# Patient Record
Sex: FEMALE | Race: WHITE | NOT HISPANIC OR LATINO | Employment: FULL TIME | URBAN - METROPOLITAN AREA
[De-identification: names, ages, dates, MRNs, and addresses within clinical notes are randomized per-mention and may not be internally consistent; named-entity substitution may affect disease eponyms.]

---

## 2017-04-29 ENCOUNTER — HOSPITAL ENCOUNTER (EMERGENCY)
Facility: HOSPITAL | Age: 47
Discharge: HOME | End: 2017-04-29
Attending: EMERGENCY MEDICINE | Admitting: EMERGENCY MEDICINE

## 2017-04-29 ENCOUNTER — APPOINTMENT (OUTPATIENT)
Dept: CT IMAGING | Facility: HOSPITAL | Age: 47
End: 2017-04-29

## 2017-04-29 VITALS
RESPIRATION RATE: 18 BRPM | TEMPERATURE: 97.8 F | HEART RATE: 82 BPM | BODY MASS INDEX: 21.52 KG/M2 | WEIGHT: 114 LBS | OXYGEN SATURATION: 98 % | SYSTOLIC BLOOD PRESSURE: 127 MMHG | DIASTOLIC BLOOD PRESSURE: 70 MMHG | HEIGHT: 61 IN

## 2017-04-29 DIAGNOSIS — N83.202 CYST OF LEFT OVARY: ICD-10-CM

## 2017-04-29 DIAGNOSIS — R10.9 LEFT FLANK PAIN: Primary | ICD-10-CM

## 2017-04-29 LAB
ALBUMIN SERPL-MCNC: 3.9 G/DL (ref 3.2–4.8)
ALBUMIN/GLOB SERPL: 1.4 G/DL (ref 1.5–2.5)
ALP SERPL-CCNC: 58 U/L (ref 25–100)
ALT SERPL W P-5'-P-CCNC: 11 U/L (ref 7–40)
ANION GAP SERPL CALCULATED.3IONS-SCNC: 3 MMOL/L (ref 3–11)
AST SERPL-CCNC: 15 U/L (ref 0–33)
B-HCG UR QL: NEGATIVE
BACTERIA UR QL AUTO: NORMAL /HPF
BASOPHILS # BLD AUTO: 0.03 10*3/MM3 (ref 0–0.2)
BASOPHILS NFR BLD AUTO: 0.4 % (ref 0–1)
BILIRUB SERPL-MCNC: 0.5 MG/DL (ref 0.3–1.2)
BILIRUB UR QL STRIP: NEGATIVE
BUN BLD-MCNC: 10 MG/DL (ref 9–23)
BUN/CREAT SERPL: 12.5 (ref 7–25)
CALCIUM SPEC-SCNC: 9.6 MG/DL (ref 8.7–10.4)
CHLORIDE SERPL-SCNC: 107 MMOL/L (ref 99–109)
CLARITY UR: ABNORMAL
CO2 SERPL-SCNC: 31 MMOL/L (ref 20–31)
COLOR UR: YELLOW
CREAT BLD-MCNC: 0.8 MG/DL (ref 0.6–1.3)
D DIMER PPP FEU-MCNC: <0.19 MG/L (FEU) (ref 0–0.5)
DEPRECATED RDW RBC AUTO: 46 FL (ref 37–54)
EOSINOPHIL # BLD AUTO: 0.07 10*3/MM3 (ref 0.1–0.3)
EOSINOPHIL NFR BLD AUTO: 0.8 % (ref 0–3)
ERYTHROCYTE [DISTWIDTH] IN BLOOD BY AUTOMATED COUNT: 12.7 % (ref 11.3–14.5)
GFR SERPL CREATININE-BSD FRML MDRD: 77 ML/MIN/1.73
GLOBULIN UR ELPH-MCNC: 2.7 GM/DL
GLUCOSE BLD-MCNC: 99 MG/DL (ref 70–100)
GLUCOSE UR STRIP-MCNC: NEGATIVE MG/DL
HCT VFR BLD AUTO: 39.8 % (ref 34.5–44)
HGB BLD-MCNC: 13.1 G/DL (ref 11.5–15.5)
HGB UR QL STRIP.AUTO: NEGATIVE
HOLD SPECIMEN: NORMAL
HOLD SPECIMEN: NORMAL
HYALINE CASTS UR QL AUTO: NORMAL /LPF
IMM GRANULOCYTES # BLD: 0.01 10*3/MM3 (ref 0–0.03)
IMM GRANULOCYTES NFR BLD: 0.1 % (ref 0–0.6)
INTERNAL NEGATIVE CONTROL: NEGATIVE
INTERNAL POSITIVE CONTROL: POSITIVE
KETONES UR QL STRIP: NEGATIVE
LEUKOCYTE ESTERASE UR QL STRIP.AUTO: NEGATIVE
LIPASE SERPL-CCNC: 27 U/L (ref 6–51)
LYMPHOCYTES # BLD AUTO: 1.62 10*3/MM3 (ref 0.6–4.8)
LYMPHOCYTES NFR BLD AUTO: 19.3 % (ref 24–44)
Lab: NORMAL
MCH RBC QN AUTO: 32.7 PG (ref 27–31)
MCHC RBC AUTO-ENTMCNC: 32.9 G/DL (ref 32–36)
MCV RBC AUTO: 99.3 FL (ref 80–99)
MONOCYTES # BLD AUTO: 0.48 10*3/MM3 (ref 0–1)
MONOCYTES NFR BLD AUTO: 5.7 % (ref 0–12)
NEUTROPHILS # BLD AUTO: 6.2 10*3/MM3 (ref 1.5–8.3)
NEUTROPHILS NFR BLD AUTO: 73.7 % (ref 41–71)
NITRITE UR QL STRIP: NEGATIVE
PH UR STRIP.AUTO: 7 [PH] (ref 5–8)
PLATELET # BLD AUTO: 281 10*3/MM3 (ref 150–450)
PMV BLD AUTO: 9.7 FL (ref 6–12)
POTASSIUM BLD-SCNC: 4.2 MMOL/L (ref 3.5–5.5)
PROT SERPL-MCNC: 6.6 G/DL (ref 5.7–8.2)
PROT UR QL STRIP: NEGATIVE
RBC # BLD AUTO: 4.01 10*6/MM3 (ref 3.89–5.14)
RBC # UR: NORMAL /HPF
REF LAB TEST METHOD: NORMAL
SODIUM BLD-SCNC: 141 MMOL/L (ref 132–146)
SP GR UR STRIP: 1.02 (ref 1–1.03)
SQUAMOUS #/AREA URNS HPF: NORMAL /HPF
UROBILINOGEN UR QL STRIP: ABNORMAL
WBC NRBC COR # BLD: 8.41 10*3/MM3 (ref 3.5–10.8)
WBC UR QL AUTO: NORMAL /HPF
WHOLE BLOOD HOLD SPECIMEN: NORMAL
WHOLE BLOOD HOLD SPECIMEN: NORMAL

## 2017-04-29 PROCEDURE — 0 IOPAMIDOL 61 % SOLUTION: Performed by: EMERGENCY MEDICINE

## 2017-04-29 PROCEDURE — 85379 FIBRIN DEGRADATION QUANT: CPT | Performed by: EMERGENCY MEDICINE

## 2017-04-29 PROCEDURE — 81001 URINALYSIS AUTO W/SCOPE: CPT | Performed by: EMERGENCY MEDICINE

## 2017-04-29 PROCEDURE — 74177 CT ABD & PELVIS W/CONTRAST: CPT

## 2017-04-29 PROCEDURE — 99283 EMERGENCY DEPT VISIT LOW MDM: CPT

## 2017-04-29 PROCEDURE — 83690 ASSAY OF LIPASE: CPT | Performed by: EMERGENCY MEDICINE

## 2017-04-29 PROCEDURE — 85025 COMPLETE CBC W/AUTO DIFF WBC: CPT | Performed by: EMERGENCY MEDICINE

## 2017-04-29 PROCEDURE — 80053 COMPREHEN METABOLIC PANEL: CPT | Performed by: EMERGENCY MEDICINE

## 2017-04-29 RX ORDER — SODIUM CHLORIDE 0.9 % (FLUSH) 0.9 %
10 SYRINGE (ML) INJECTION AS NEEDED
Status: DISCONTINUED | OUTPATIENT
Start: 2017-04-29 | End: 2017-04-29 | Stop reason: HOSPADM

## 2017-04-29 RX ORDER — LEVOTHYROXINE SODIUM 0.05 MG/1
50 TABLET ORAL DAILY
COMMUNITY
End: 2023-02-16 | Stop reason: SDUPTHER

## 2017-04-29 RX ORDER — CHLORAL HYDRATE 500 MG
CAPSULE ORAL
COMMUNITY

## 2017-04-29 RX ADMIN — IOPAMIDOL 100 ML: 612 INJECTION, SOLUTION INTRAVENOUS at 17:22

## 2017-07-25 ENCOUNTER — TRANSCRIBE ORDERS (OUTPATIENT)
Dept: ADMINISTRATIVE | Facility: HOSPITAL | Age: 47
End: 2017-07-25

## 2017-07-25 DIAGNOSIS — Z12.31 VISIT FOR SCREENING MAMMOGRAM: Primary | ICD-10-CM

## 2017-08-23 ENCOUNTER — HOSPITAL ENCOUNTER (OUTPATIENT)
Dept: MAMMOGRAPHY | Facility: HOSPITAL | Age: 47
Discharge: HOME | End: 2017-08-23
Attending: OBSTETRICS & GYNECOLOGY | Admitting: OBSTETRICS & GYNECOLOGY

## 2017-08-23 DIAGNOSIS — Z12.31 VISIT FOR SCREENING MAMMOGRAM: ICD-10-CM

## 2017-08-23 PROCEDURE — G0202 SCR MAMMO BI INCL CAD: HCPCS

## 2017-08-23 PROCEDURE — 77063 BREAST TOMOSYNTHESIS BI: CPT | Performed by: RADIOLOGY

## 2017-08-23 PROCEDURE — 77063 BREAST TOMOSYNTHESIS BI: CPT

## 2017-08-23 PROCEDURE — 77067 SCR MAMMO BI INCL CAD: CPT | Performed by: RADIOLOGY

## 2018-05-29 ENCOUNTER — TRANSCRIBE ORDERS (OUTPATIENT)
Dept: ADMINISTRATIVE | Facility: HOSPITAL | Age: 48
End: 2018-05-29

## 2018-05-29 DIAGNOSIS — Z12.31 VISIT FOR SCREENING MAMMOGRAM: Primary | ICD-10-CM

## 2018-08-29 ENCOUNTER — HOSPITAL ENCOUNTER (OUTPATIENT)
Dept: MAMMOGRAPHY | Facility: HOSPITAL | Age: 48
Discharge: HOME | End: 2018-08-29
Attending: OBSTETRICS & GYNECOLOGY | Admitting: OBSTETRICS & GYNECOLOGY

## 2018-08-29 DIAGNOSIS — Z12.31 VISIT FOR SCREENING MAMMOGRAM: ICD-10-CM

## 2018-08-29 PROCEDURE — 77063 BREAST TOMOSYNTHESIS BI: CPT | Performed by: RADIOLOGY

## 2018-08-29 PROCEDURE — 77067 SCR MAMMO BI INCL CAD: CPT | Performed by: RADIOLOGY

## 2018-08-29 PROCEDURE — 77067 SCR MAMMO BI INCL CAD: CPT

## 2018-08-29 PROCEDURE — 77063 BREAST TOMOSYNTHESIS BI: CPT

## 2019-07-23 ENCOUNTER — TRANSCRIBE ORDERS (OUTPATIENT)
Dept: ADMINISTRATIVE | Facility: HOSPITAL | Age: 49
End: 2019-07-23

## 2019-07-23 DIAGNOSIS — Z12.31 VISIT FOR SCREENING MAMMOGRAM: Primary | ICD-10-CM

## 2019-09-11 ENCOUNTER — APPOINTMENT (OUTPATIENT)
Dept: MAMMOGRAPHY | Facility: HOSPITAL | Age: 49
End: 2019-09-11

## 2019-10-22 ENCOUNTER — TRANSCRIBE ORDERS (OUTPATIENT)
Dept: ADMINISTRATIVE | Facility: HOSPITAL | Age: 49
End: 2019-10-22

## 2019-10-22 DIAGNOSIS — Z12.31 VISIT FOR SCREENING MAMMOGRAM: Primary | ICD-10-CM

## 2019-12-18 ENCOUNTER — HOSPITAL ENCOUNTER (OUTPATIENT)
Dept: MAMMOGRAPHY | Facility: HOSPITAL | Age: 49
Discharge: HOME | End: 2019-12-18
Admitting: OBSTETRICS & GYNECOLOGY

## 2019-12-18 DIAGNOSIS — Z12.31 VISIT FOR SCREENING MAMMOGRAM: ICD-10-CM

## 2019-12-18 PROCEDURE — 77063 BREAST TOMOSYNTHESIS BI: CPT

## 2019-12-18 PROCEDURE — 77067 SCR MAMMO BI INCL CAD: CPT

## 2019-12-18 PROCEDURE — 77063 BREAST TOMOSYNTHESIS BI: CPT | Performed by: RADIOLOGY

## 2019-12-18 PROCEDURE — 77067 SCR MAMMO BI INCL CAD: CPT | Performed by: RADIOLOGY

## 2020-11-09 ENCOUNTER — TRANSCRIBE ORDERS (OUTPATIENT)
Dept: OBSTETRICS AND GYNECOLOGY | Facility: CLINIC | Age: 50
End: 2020-11-09

## 2020-11-09 DIAGNOSIS — Z12.31 VISIT FOR SCREENING MAMMOGRAM: Primary | ICD-10-CM

## 2021-01-08 RX ORDER — FLUCONAZOLE 150 MG/1
TABLET ORAL
Qty: 4 TABLET | Refills: 11 | OUTPATIENT
Start: 2021-01-08

## 2021-01-08 RX ORDER — FLUCONAZOLE 150 MG/1
TABLET ORAL
Qty: 4 TABLET | Refills: 4 | OUTPATIENT
Start: 2021-01-08

## 2021-01-14 RX ORDER — FLUCONAZOLE 150 MG/1
150 TABLET ORAL DAILY
Qty: 4 TABLET | Refills: 1 | Status: SHIPPED | OUTPATIENT
Start: 2021-01-14 | End: 2021-02-10 | Stop reason: SDUPTHER

## 2021-02-10 ENCOUNTER — HOSPITAL ENCOUNTER (OUTPATIENT)
Dept: MAMMOGRAPHY | Facility: HOSPITAL | Age: 51
Discharge: HOME | End: 2021-02-10
Admitting: OBSTETRICS & GYNECOLOGY

## 2021-02-10 ENCOUNTER — LAB (OUTPATIENT)
Dept: LAB | Facility: HOSPITAL | Age: 51
End: 2021-02-10

## 2021-02-10 ENCOUNTER — OFFICE VISIT (OUTPATIENT)
Dept: OBSTETRICS AND GYNECOLOGY | Facility: CLINIC | Age: 51
End: 2021-02-10

## 2021-02-10 VITALS
SYSTOLIC BLOOD PRESSURE: 110 MMHG | DIASTOLIC BLOOD PRESSURE: 68 MMHG | BODY MASS INDEX: 22.66 KG/M2 | HEIGHT: 61 IN | WEIGHT: 120 LBS

## 2021-02-10 DIAGNOSIS — Z30.431 IUD CHECK UP: ICD-10-CM

## 2021-02-10 DIAGNOSIS — Z01.419 WELL WOMAN EXAM WITH ROUTINE GYNECOLOGICAL EXAM: Primary | ICD-10-CM

## 2021-02-10 DIAGNOSIS — R63.5 WEIGHT GAIN: ICD-10-CM

## 2021-02-10 DIAGNOSIS — Z12.31 VISIT FOR SCREENING MAMMOGRAM: ICD-10-CM

## 2021-02-10 DIAGNOSIS — N76.0 RECURRENT VAGINITIS: ICD-10-CM

## 2021-02-10 LAB
25(OH)D3 SERPL-MCNC: 50.1 NG/ML (ref 30–100)
BASOPHILS # BLD AUTO: 0.05 10*3/MM3 (ref 0–0.2)
BASOPHILS NFR BLD AUTO: 1 % (ref 0–1.5)
DEPRECATED RDW RBC AUTO: 41.5 FL (ref 37–54)
EOSINOPHIL # BLD AUTO: 0.06 10*3/MM3 (ref 0–0.4)
EOSINOPHIL NFR BLD AUTO: 1.2 % (ref 0.3–6.2)
ERYTHROCYTE [DISTWIDTH] IN BLOOD BY AUTOMATED COUNT: 11.8 % (ref 12.3–15.4)
HBA1C MFR BLD: 4.95 % (ref 4.8–5.6)
HCT VFR BLD AUTO: 39.7 % (ref 34–46.6)
HGB BLD-MCNC: 13.4 G/DL (ref 12–15.9)
IMM GRANULOCYTES # BLD AUTO: 0.01 10*3/MM3 (ref 0–0.05)
IMM GRANULOCYTES NFR BLD AUTO: 0.2 % (ref 0–0.5)
LYMPHOCYTES # BLD AUTO: 1.61 10*3/MM3 (ref 0.7–3.1)
LYMPHOCYTES NFR BLD AUTO: 32.9 % (ref 19.6–45.3)
MCH RBC QN AUTO: 32.3 PG (ref 26.6–33)
MCHC RBC AUTO-ENTMCNC: 33.8 G/DL (ref 31.5–35.7)
MCV RBC AUTO: 95.7 FL (ref 79–97)
MONOCYTES # BLD AUTO: 0.38 10*3/MM3 (ref 0.1–0.9)
MONOCYTES NFR BLD AUTO: 7.8 % (ref 5–12)
NEUTROPHILS NFR BLD AUTO: 2.78 10*3/MM3 (ref 1.7–7)
NEUTROPHILS NFR BLD AUTO: 56.9 % (ref 42.7–76)
NRBC BLD AUTO-RTO: 0 /100 WBC (ref 0–0.2)
PLATELET # BLD AUTO: 303 10*3/MM3 (ref 140–450)
PMV BLD AUTO: 10.4 FL (ref 6–12)
RBC # BLD AUTO: 4.15 10*6/MM3 (ref 3.77–5.28)
WBC # BLD AUTO: 4.89 10*3/MM3 (ref 3.4–10.8)

## 2021-02-10 PROCEDURE — 77063 BREAST TOMOSYNTHESIS BI: CPT

## 2021-02-10 PROCEDURE — 77063 BREAST TOMOSYNTHESIS BI: CPT | Performed by: RADIOLOGY

## 2021-02-10 PROCEDURE — 77067 SCR MAMMO BI INCL CAD: CPT

## 2021-02-10 PROCEDURE — 99214 OFFICE O/P EST MOD 30 MIN: CPT | Performed by: OBSTETRICS & GYNECOLOGY

## 2021-02-10 PROCEDURE — 85025 COMPLETE CBC W/AUTO DIFF WBC: CPT | Performed by: OBSTETRICS & GYNECOLOGY

## 2021-02-10 PROCEDURE — 36415 COLL VENOUS BLD VENIPUNCTURE: CPT | Performed by: OBSTETRICS & GYNECOLOGY

## 2021-02-10 PROCEDURE — 83036 HEMOGLOBIN GLYCOSYLATED A1C: CPT | Performed by: OBSTETRICS & GYNECOLOGY

## 2021-02-10 PROCEDURE — 80053 COMPREHEN METABOLIC PANEL: CPT | Performed by: OBSTETRICS & GYNECOLOGY

## 2021-02-10 PROCEDURE — 83001 ASSAY OF GONADOTROPIN (FSH): CPT | Performed by: OBSTETRICS & GYNECOLOGY

## 2021-02-10 PROCEDURE — 77067 SCR MAMMO BI INCL CAD: CPT | Performed by: RADIOLOGY

## 2021-02-10 PROCEDURE — 99396 PREV VISIT EST AGE 40-64: CPT | Performed by: OBSTETRICS & GYNECOLOGY

## 2021-02-10 PROCEDURE — 84443 ASSAY THYROID STIM HORMONE: CPT | Performed by: OBSTETRICS & GYNECOLOGY

## 2021-02-10 PROCEDURE — 80061 LIPID PANEL: CPT | Performed by: OBSTETRICS & GYNECOLOGY

## 2021-02-10 PROCEDURE — 82306 VITAMIN D 25 HYDROXY: CPT | Performed by: OBSTETRICS & GYNECOLOGY

## 2021-02-10 RX ORDER — FLUCONAZOLE 150 MG/1
150 TABLET ORAL DAILY
Qty: 4 TABLET | Refills: 11 | Status: SHIPPED | OUTPATIENT
Start: 2021-02-10 | End: 2021-08-04

## 2021-02-10 RX ORDER — LORAZEPAM 1 MG/1
TABLET ORAL
COMMUNITY
Start: 2021-01-22 | End: 2023-02-22

## 2021-02-10 NOTE — PROGRESS NOTES
Annual GYN < 27 years of age        Patient Name: Joycelyn Quintero  : 1970   MRN: [id number]     Chief Complaint:    Chief Complaint   Patient presents with   • Gynecologic Exam       History of Present Illness: Joycelyn Quintero is a 50 y.o. female,   who presents today for her annual well woman exam. C/o weight gain, bloating, cramping.     Menstrual History:   Last LMP: No LMP recorded. Patient has had an implant. She has a Mirena IUD that was placed in .    Last Pap : ; neg.   Last Completed Pap Smear       Status Date      PAP SMEAR No completions recorded        Periods/Cycles are: absent  Sexual History:   Sexually active: yes  Partner Status: Marital Status: single.    Contraception: contraceptive methods: IUD.  Insertion date:   STD testing: declines     She needs a refill of her diflucan.     Subjective      Review of Systems:   Review of Systems   Constitutional: Positive for unexpected weight gain.   HENT: Negative.    Eyes: Negative.    Respiratory: Negative.    Cardiovascular: Negative.    Gastrointestinal: Negative.    Endocrine: Negative.    Genitourinary: Positive for menstrual problem.   Musculoskeletal: Negative.    Skin: Negative.    Allergic/Immunologic: Negative.    Neurological: Negative.    Hematological: Negative.    Psychiatric/Behavioral: Negative.        The additional following portions of the patient's history were reviewed and updated as appropriate: past medical history, past surgical history, past family history, past social history.     I have reviewed and agree with the HPI, ROS and historical information as entered above. Karen Phillips MD    OB History:   OB History        2    Para   2    Term   2            AB        Living           SAB        TAB        Ectopic        Molar        Multiple        Live Births                   Tobacco Usage: never  Social History     Tobacco Use   Smoking Status Never Smoker   Smokeless Tobacco  "Never Used      Alcohol/Drug Use: socially  Social History     Substance and Sexual Activity   Alcohol Use No        Medications:     Current Outpatient Medications:   •  fluconazole (Diflucan) 150 MG tablet, Take 1 tablet by mouth Daily. take one tablet weekly, Disp: 4 tablet, Rfl: 11  •  levothyroxine (SYNTHROID, LEVOTHROID) 50 MCG tablet, Take 50 mcg by mouth Daily., Disp: , Rfl:   •  LORazepam (ATIVAN) 1 MG tablet, TAKE 1/2 1 TABLET BY MOUTH AT NIGHT FOR MUSCLE SPASMS, Disp: , Rfl:   •  Multiple Vitamins-Minerals (MULTIVITAMIN & MINERAL PO), Take  by mouth., Disp: , Rfl:   •  Omega-3 Fatty Acids (FISH OIL) 1000 MG capsule capsule, Take  by mouth Daily With Breakfast., Disp: , Rfl:     Allergies:   Allergies   Allergen Reactions   • Biaxin [Clarithromycin]        Health History:   Exercises Regularly : yes  Monthly Self-Breast Exam: yes      Immunizations/Vaccinations:   Flu (Yearly): no  Gardasil: no         Objective     Physical Exam:  Vital Signs: /68   Ht 154.9 cm (61\")   Wt 54.4 kg (120 lb)   Breastfeeding No Comment: IUD  BMI 22.67 kg/m²    Body mass index is 22.67 kg/m².     Physical Exam  Vitals signs and nursing note reviewed. Exam conducted with a chaperone present.   Constitutional:       Appearance: She is well-developed.   HENT:      Head: Normocephalic and atraumatic.   Eyes:      Pupils: Pupils are equal, round, and reactive to light.   Neck:      Musculoskeletal: Normal range of motion. No muscular tenderness.      Thyroid: No thyroid mass or thyromegaly.   Pulmonary:      Effort: Pulmonary effort is normal. No retractions.   Chest:      Chest wall: No mass.      Breasts:         Right: Normal. No mass, nipple discharge, skin change or tenderness.         Left: Normal. No mass, nipple discharge, skin change or tenderness.   Abdominal:      General: Bowel sounds are normal.      Palpations: Abdomen is soft. Abdomen is not rigid. There is no mass.      Tenderness: There is no abdominal " tenderness. There is no guarding.      Hernia: No hernia is present. There is no hernia in the left inguinal area or right inguinal area.   Genitourinary:     Exam position: Lithotomy position.      Pubic Area: No rash.       Labia:         Right: No rash, tenderness or lesion.         Left: No rash, tenderness or lesion.       Urethra: No urethral pain or urethral swelling.      Vagina: Normal. No vaginal discharge or lesions.      Cervix: No cervical motion tenderness, discharge, lesion or cervical bleeding.      Uterus: Normal. Not enlarged, not fixed and not tender.       Adnexa:         Right: No mass, tenderness or fullness.          Left: No mass, tenderness or fullness.        Rectum: No external hemorrhoid.   Musculoskeletal:      Right lower leg: No edema.      Left lower leg: No edema.   Skin:     General: Skin is warm and dry.   Neurological:      Mental Status: She is alert and oriented to person, place, and time.      Motor: Motor function is intact.   Psychiatric:         Mood and Affect: Mood and affect normal.         Behavior: Behavior normal.     strings seen    Assessment / Plan      Assessment/Plan:   Diagnoses and all orders for this visit:    1. Well woman exam with routine gynecological exam (Primary)  -     Pap IG, HPV-hr; Future    2. IUD check up    3. Weight gain  -     CBC & Differential  -     Comprehensive Metabolic Panel  -     Hemoglobin A1c  -     Vitamin D 25 Hydroxy  -     TSH  -     Lipid Panel  -     Follicle Stimulating Hormone    4. Recurrent vaginitis    Other orders  -     fluconazole (Diflucan) 150 MG tablet; Take 1 tablet by mouth Daily. take one tablet weekly  Dispense: 4 tablet; Refill: 11          1. IUD check  2. Self Breast Exams demonstrated and encouraged.   3. Discussed STD, use of condoms, use of contraception.   4. Diet and Exercise discussed and encouraged.     Follow Up: Return in about 1 year (around 2/10/2022) for Annual physical.        Karen Phillips,  MD  02/10/2021

## 2021-02-11 LAB
ALBUMIN SERPL-MCNC: 4.3 G/DL (ref 3.5–5.2)
ALBUMIN/GLOB SERPL: 1.8 G/DL
ALP SERPL-CCNC: 87 U/L (ref 39–117)
ALT SERPL W P-5'-P-CCNC: 12 U/L (ref 1–33)
ANION GAP SERPL CALCULATED.3IONS-SCNC: 8.9 MMOL/L (ref 5–15)
AST SERPL-CCNC: 14 U/L (ref 1–32)
BILIRUB SERPL-MCNC: 0.4 MG/DL (ref 0–1.2)
BUN SERPL-MCNC: 14 MG/DL (ref 6–20)
BUN/CREAT SERPL: 19.4 (ref 7–25)
CALCIUM SPEC-SCNC: 9.4 MG/DL (ref 8.6–10.5)
CHLORIDE SERPL-SCNC: 107 MMOL/L (ref 98–107)
CHOLEST SERPL-MCNC: 139 MG/DL (ref 0–200)
CO2 SERPL-SCNC: 26.1 MMOL/L (ref 22–29)
CREAT SERPL-MCNC: 0.72 MG/DL (ref 0.57–1)
FSH SERPL-ACNC: 56.7 MIU/ML
GFR SERPL CREATININE-BSD FRML MDRD: 86 ML/MIN/1.73
GLOBULIN UR ELPH-MCNC: 2.4 GM/DL
GLUCOSE SERPL-MCNC: 83 MG/DL (ref 65–99)
HDLC SERPL-MCNC: 66 MG/DL (ref 40–60)
LDLC SERPL CALC-MCNC: 66 MG/DL (ref 0–100)
LDLC/HDLC SERPL: 1.03 {RATIO}
POTASSIUM SERPL-SCNC: 4.3 MMOL/L (ref 3.5–5.2)
PROT SERPL-MCNC: 6.7 G/DL (ref 6–8.5)
SODIUM SERPL-SCNC: 142 MMOL/L (ref 136–145)
TRIGL SERPL-MCNC: 24 MG/DL (ref 0–150)
TSH SERPL DL<=0.05 MIU/L-ACNC: 2.29 UIU/ML (ref 0.27–4.2)
VLDLC SERPL-MCNC: 7 MG/DL (ref 5–40)

## 2021-02-17 DIAGNOSIS — Z01.419 WELL WOMAN EXAM WITH ROUTINE GYNECOLOGICAL EXAM: ICD-10-CM

## 2021-03-04 ENCOUNTER — HOSPITAL ENCOUNTER (OUTPATIENT)
Dept: MAMMOGRAPHY | Facility: HOSPITAL | Age: 51
Discharge: HOME | End: 2021-03-04
Admitting: RADIOLOGY

## 2021-03-04 DIAGNOSIS — R92.8 ABNORMAL MAMMOGRAM: ICD-10-CM

## 2021-03-04 PROCEDURE — 77061 BREAST TOMOSYNTHESIS UNI: CPT | Performed by: RADIOLOGY

## 2021-03-04 PROCEDURE — 77065 DX MAMMO INCL CAD UNI: CPT | Performed by: RADIOLOGY

## 2021-03-04 PROCEDURE — G0279 TOMOSYNTHESIS, MAMMO: HCPCS

## 2021-03-04 PROCEDURE — 77065 DX MAMMO INCL CAD UNI: CPT

## 2021-08-04 RX ORDER — FLUCONAZOLE 150 MG/1
TABLET ORAL
Qty: 4 TABLET | Refills: 5 | Status: SHIPPED | OUTPATIENT
Start: 2021-08-04 | End: 2022-02-24

## 2022-01-10 ENCOUNTER — TRANSCRIBE ORDERS (OUTPATIENT)
Dept: ADMINISTRATIVE | Facility: HOSPITAL | Age: 52
End: 2022-01-10

## 2022-01-10 DIAGNOSIS — Z12.31 VISIT FOR SCREENING MAMMOGRAM: Primary | ICD-10-CM

## 2022-02-24 DIAGNOSIS — N76.0 RECURRENT VAGINITIS: Primary | ICD-10-CM

## 2022-02-24 RX ORDER — FLUCONAZOLE 150 MG/1
TABLET ORAL
Qty: 4 TABLET | Refills: 5 | Status: SHIPPED | OUTPATIENT
Start: 2022-02-24 | End: 2022-08-15

## 2022-02-24 NOTE — TELEPHONE ENCOUNTER
Patient requesting refill for Diflucan. Patient states she is completely out. Verified patients pharmacy as [facility] in [place]. Advised patient that I would send to [facility] and have it called it. Annual appt scheduled 03/09/2022. Patient V/U.

## 2022-03-09 ENCOUNTER — APPOINTMENT (OUTPATIENT)
Dept: MAMMOGRAPHY | Facility: HOSPITAL | Age: 52
End: 2022-03-09

## 2022-04-20 ENCOUNTER — HOSPITAL ENCOUNTER (OUTPATIENT)
Dept: MAMMOGRAPHY | Facility: HOSPITAL | Age: 52
Discharge: HOME | End: 2022-04-20
Admitting: OBSTETRICS & GYNECOLOGY

## 2022-04-20 ENCOUNTER — OFFICE VISIT (OUTPATIENT)
Dept: OBSTETRICS AND GYNECOLOGY | Facility: CLINIC | Age: 52
End: 2022-04-20

## 2022-04-20 VITALS
WEIGHT: 124 LBS | SYSTOLIC BLOOD PRESSURE: 124 MMHG | HEIGHT: 61 IN | DIASTOLIC BLOOD PRESSURE: 78 MMHG | BODY MASS INDEX: 23.41 KG/M2

## 2022-04-20 DIAGNOSIS — Z12.31 VISIT FOR SCREENING MAMMOGRAM: ICD-10-CM

## 2022-04-20 DIAGNOSIS — Z30.431 IUD CHECK UP: ICD-10-CM

## 2022-04-20 DIAGNOSIS — Z01.419 WOMEN'S ANNUAL ROUTINE GYNECOLOGICAL EXAMINATION: Primary | ICD-10-CM

## 2022-04-20 PROCEDURE — 99396 PREV VISIT EST AGE 40-64: CPT | Performed by: OBSTETRICS & GYNECOLOGY

## 2022-04-20 PROCEDURE — 77063 BREAST TOMOSYNTHESIS BI: CPT

## 2022-04-20 PROCEDURE — 77067 SCR MAMMO BI INCL CAD: CPT

## 2022-04-20 PROCEDURE — 77067 SCR MAMMO BI INCL CAD: CPT | Performed by: RADIOLOGY

## 2022-04-20 PROCEDURE — 77063 BREAST TOMOSYNTHESIS BI: CPT | Performed by: RADIOLOGY

## 2022-04-20 NOTE — PROGRESS NOTES
GYN Annual Exam     CC - Here for annual exam.        HPI  Joycelyn Quintero is a 51 y.o. female, , who presents for annual well woman exam.  She is perimenopausal.  Periods are absent secondary to Mirena. Dysmenorrhea:none. Patient reports problems with: hot flashes, night sweats, weight gain, and trouble sleeping. Patient reports these symptoms have been going on for the last year. There were no changes to her medical or surgical history since her last visit.. Partner Status: Marital Status: single.  New Partners since last visit: no.  Patient declines the need for STD testing today.     Patient requested repeat pap smear this year.     Additional OB/GYN History   Current contraception: contraceptive methods: IUD.  Insertion date:  - Mirena   Desires to: continue contraception  On HRT? No  Last Pap : 02/10/2021. Results: Negative HPV Negative   Last Completed Pap Smear          Ordered - PAP SMEAR (Every 3 Years) Ordered on 2021  Pap IG, HPV-hr              History of abnormal Pap smear: no  Family history of uterine, colon, breast, or ovarian cancer: no  Performs monthly Self-Breast Exam: no  Last mammogram: 2022 Done at [facility].    Last Completed Mammogram          Scheduled - MAMMOGRAM (Every 2 Years) Scheduled for 2021  Mammo Diagnostic Digital Tomosynthesis Left With CAD    02/10/2021  Mammo Screening Digital Tomosynthesis Bilateral With CAD    2019  Mammo Screening Digital Tomosynthesis Bilateral With CAD    2018  Mammo Screening Digital Tomosynthesis Bilateral With CAD    2017  Mammo Screening Digital Tomosynthesis Bilateral With CAD    Only the first 5 history entries have been loaded, but more history exists.              Last colonoscopy: Years ago per pt - [facility] [place] - Dr. Kendall Thompson - Normal per pt.   Last Completed Colonoscopy     This patient has no relevant Health Maintenance data.         Last DEXA: None  Exercises Regularly: no  Feelings of Anxiety or Depression: no      Tobacco Usage?: No   OB History        2    Para   2    Term   2            AB        Living   2       SAB        IAB        Ectopic        Molar        Multiple        Live Births   2                Health Maintenance   Topic Date Due   • COLORECTAL CANCER SCREENING  Never done   • ANNUAL PHYSICAL  Never done   • COVID-19 Vaccine (1) Never done   • TDAP/TD VACCINES (1 - Tdap) Never done   • HEPATITIS C SCREENING  Never done   • ZOSTER VACCINE (1 of 2) Never done   • Annual Gynecologic Pelvic and Breast Exam  2022   • INFLUENZA VACCINE  2022   • MAMMOGRAM  2023   • PAP SMEAR  2024   • Pneumococcal Vaccine 0-64  Aged Out       The additional following portions of the patient's history were reviewed and updated as appropriate: allergies, current medications, past family history, past medical history, past social history and past surgical history.    Review of Systems   Constitutional: Negative for chills, fatigue, unexpected weight gain and unexpected weight loss.   HENT: Negative for sore throat and swollen glands.    Respiratory: Negative for cough and shortness of breath.    Cardiovascular: Negative for chest pain, palpitations and leg swelling.   Gastrointestinal: Negative for abdominal distention, abdominal pain, blood in stool, constipation, diarrhea and nausea.   Endocrine: Negative for cold intolerance and heat intolerance.   Genitourinary: Negative for breast discharge, breast lump, frequency, hematuria, pelvic pain, vaginal bleeding, vaginal discharge and vaginal pain.   Musculoskeletal: Negative for gait problem and myalgias.   Allergic/Immunologic: Negative for immunocompromised state.   Neurological: Negative for dizziness, headache and confusion.   Psychiatric/Behavioral: Negative for suicidal ideas and depressed mood.       I have reviewed and agree with the HPI, ROS, and  "historical information as entered above. Karen Phillips MD    Objective   /78 (BP Location: Left arm, Patient Position: Sitting, Cuff Size: Adult)   Ht 154.9 cm (61\")   Wt 56.2 kg (124 lb)   LMP  (LMP Unknown)   BMI 23.43 kg/m²     Physical Exam  Vitals and nursing note reviewed. Exam conducted with a chaperone present.   Constitutional:       Appearance: She is well-developed.   HENT:      Head: Normocephalic and atraumatic.   Eyes:      Pupils: Pupils are equal, round, and reactive to light.   Neck:      Thyroid: No thyroid mass or thyromegaly.   Pulmonary:      Effort: Pulmonary effort is normal. No retractions.   Chest:      Chest wall: No mass.   Breasts:      Right: Normal. No mass, nipple discharge, skin change or tenderness.      Left: Normal. No mass, nipple discharge, skin change or tenderness.       Abdominal:      General: Bowel sounds are normal.      Palpations: Abdomen is soft. Abdomen is not rigid. There is no mass.      Tenderness: There is no abdominal tenderness. There is no guarding.      Hernia: No hernia is present. There is no hernia in the left inguinal area or right inguinal area.   Genitourinary:     Exam position: Lithotomy position.      Pubic Area: No rash.       Labia:         Right: No rash, tenderness or lesion.         Left: No rash, tenderness or lesion.       Urethra: No urethral pain or urethral swelling.      Vagina: Normal. No vaginal discharge or lesions.      Cervix: No cervical motion tenderness, discharge, lesion or cervical bleeding.      Uterus: Normal. Not enlarged, not fixed and not tender.       Adnexa:         Right: No mass, tenderness or fullness.          Left: No mass, tenderness or fullness.        Rectum: No external hemorrhoid.   Musculoskeletal:      Cervical back: Normal range of motion. No muscular tenderness.      Right lower leg: No edema.      Left lower leg: No edema.   Skin:     General: Skin is warm and dry.   Neurological:      Mental Status: " She is alert and oriented to person, place, and time.      Motor: Motor function is intact.   Psychiatric:         Mood and Affect: Mood and affect normal.         Behavior: Behavior normal.     strings seen       Assessment and Plan    Problem List Items Addressed This Visit    None     Visit Diagnoses     Women's annual routine gynecological examination    -  Primary    Relevant Orders    Pap IG, Rfx HPV ASCU    IUD check up              1. GYN annual well woman exam.   2. Reviewed monthly self breast exams.  Instructed to call with lumps, pain, or breast discharge.  Yearly mammograms ordered.  3. Ordered mammogram today.  4. Reviewed exercise as a preventative health measures.   5. Symptoms of menopausal transition reviewed with patient.   6. Reccommended Flu Vaccine in Fall of each year.  7. RTC in 1 year or PRN with problems.  8. Return in about 1 year (around 4/20/2023) for Annual physical.     Karen Phillips MD  04/20/2022

## 2022-04-26 DIAGNOSIS — Z01.419 WOMEN'S ANNUAL ROUTINE GYNECOLOGICAL EXAMINATION: ICD-10-CM

## 2022-08-13 DIAGNOSIS — N76.0 RECURRENT VAGINITIS: ICD-10-CM

## 2022-08-15 RX ORDER — FLUCONAZOLE 150 MG/1
TABLET ORAL
Qty: 4 TABLET | Refills: 5 | Status: SHIPPED | OUTPATIENT
Start: 2022-08-15 | End: 2023-03-16 | Stop reason: SDUPTHER

## 2023-02-16 ENCOUNTER — TELEPHONE (OUTPATIENT)
Dept: FAMILY MEDICINE CLINIC | Facility: CLINIC | Age: 53
End: 2023-02-16

## 2023-02-16 DIAGNOSIS — E03.9 HYPOTHYROIDISM, UNSPECIFIED TYPE: Primary | ICD-10-CM

## 2023-02-16 RX ORDER — LEVOTHYROXINE SODIUM 0.05 MG/1
50 TABLET ORAL DAILY
Qty: 7 TABLET | Refills: 0 | Status: SHIPPED | OUTPATIENT
Start: 2023-02-16 | End: 2023-03-03

## 2023-02-16 RX ORDER — LEVOTHYROXINE SODIUM 0.05 MG/1
50 TABLET ORAL DAILY
Status: CANCELLED | OUTPATIENT
Start: 2023-02-16

## 2023-02-16 NOTE — TELEPHONE ENCOUNTER
Caller: Joycelyn Quintero    Relationship: Self    Best call back number: [phone]    Requested Prescriptions:   Requested Prescriptions     Pending Prescriptions Disp Refills   • levothyroxine (SYNTHROID, LEVOTHROID) 50 MCG tablet       Sig: Take 1 tablet by mouth Daily.        Pharmacy where request should be sent: [facility][id number] - [place], [place] - [facility] [address] [facility] TO [facility] - [phone]  - [phone] FX     Additional details provided by patient: PATIENT STATED SHE REQUESTED A REFILL SEVERAL DAYS AGO AND HAS BEEN OUT OF HER PRESCRIPTION FOR 4 DAYS.    Does the patient have less than a 3 day supply:  [x] Yes  [] No    Would you like a call back once the refill request has been completed: [x] Yes [] No    If the office needs to give you a call back, can they leave a voicemail: [x] Yes [] No    Gurwinder Estrada Rep   02/16/23 09:19 EST

## 2023-02-16 NOTE — TELEPHONE ENCOUNTER
Called pt to discuss that she has not been seen for an annual visit in 2 years and will need to be seen for further medication refills. She was not happy because she has had nothing but trouble trying to call out here and not getting through and she almost just choose to not come back. I offered her an appointment with Lauren next week and let her know we would send in a refill of her meds but she would need to keep the appointment. She did not want to see Lauren because she doesn't know her and only wanted to see Morris or Lorenzo. I scheduled her with Lorenzo and informed Julissa (practice manager) and she is going to call and discuss that Lorenzo and Morris are not taking on new adult patients at this time.

## 2023-02-21 PROBLEM — F41.9 ANXIETY DISORDER: Status: ACTIVE | Noted: 2023-02-21

## 2023-02-21 PROBLEM — F41.0 PANIC ATTACKS: Status: ACTIVE | Noted: 2023-02-21

## 2023-02-21 PROBLEM — I34.1 MITRAL VALVE PROLAPSE: Status: ACTIVE | Noted: 2023-02-21

## 2023-02-21 PROBLEM — M54.50 LUMBAGO: Status: ACTIVE | Noted: 2023-02-21

## 2023-02-21 PROBLEM — M25.50 JOINT PAIN: Status: ACTIVE | Noted: 2023-02-21

## 2023-02-21 PROBLEM — M79.10 MYALGIA: Status: ACTIVE | Noted: 2023-02-21

## 2023-02-21 PROBLEM — E03.9 HYPOTHYROIDISM: Status: ACTIVE | Noted: 2023-02-21

## 2023-02-22 ENCOUNTER — OFFICE VISIT (OUTPATIENT)
Dept: FAMILY MEDICINE CLINIC | Facility: CLINIC | Age: 53
End: 2023-02-22
Payer: COMMERCIAL

## 2023-02-22 VITALS
WEIGHT: 129 LBS | SYSTOLIC BLOOD PRESSURE: 120 MMHG | BODY MASS INDEX: 24.35 KG/M2 | HEART RATE: 77 BPM | HEIGHT: 61 IN | DIASTOLIC BLOOD PRESSURE: 76 MMHG | OXYGEN SATURATION: 97 % | TEMPERATURE: 97 F

## 2023-02-22 DIAGNOSIS — G47.00 INSOMNIA, UNSPECIFIED TYPE: ICD-10-CM

## 2023-02-22 DIAGNOSIS — Z12.11 SCREEN FOR COLON CANCER: ICD-10-CM

## 2023-02-22 DIAGNOSIS — Z11.4 SCREENING FOR HIV (HUMAN IMMUNODEFICIENCY VIRUS): ICD-10-CM

## 2023-02-22 DIAGNOSIS — Z01.818 PREOP EXAMINATION: ICD-10-CM

## 2023-02-22 DIAGNOSIS — Z11.59 NEED FOR HEPATITIS C SCREENING TEST: ICD-10-CM

## 2023-02-22 DIAGNOSIS — Z00.01 ENCOUNTER FOR GENERAL ADULT MEDICAL EXAMINATION WITH ABNORMAL FINDINGS: Primary | ICD-10-CM

## 2023-02-22 DIAGNOSIS — Z23 NEED FOR TDAP VACCINATION: ICD-10-CM

## 2023-02-22 DIAGNOSIS — E03.9 ACQUIRED HYPOTHYROIDISM: ICD-10-CM

## 2023-02-22 PROCEDURE — 90471 IMMUNIZATION ADMIN: CPT | Performed by: INTERNAL MEDICINE

## 2023-02-22 PROCEDURE — 99213 OFFICE O/P EST LOW 20 MIN: CPT | Performed by: INTERNAL MEDICINE

## 2023-02-22 PROCEDURE — 99396 PREV VISIT EST AGE 40-64: CPT | Performed by: INTERNAL MEDICINE

## 2023-02-22 PROCEDURE — 90715 TDAP VACCINE 7 YRS/> IM: CPT | Performed by: INTERNAL MEDICINE

## 2023-02-22 NOTE — PROGRESS NOTES
Female Physical Note      Date: 2023   Patient Name: Joycelyn Quintero  : 1970   MRN: [id number]     Chief Complaint:    Chief Complaint   Patient presents with   • Annual Exam       History of Present Illness: Joycelyn Quintero is a 52 y.o. female who is here today for their annual health maintenance and physical.  Patient has a history of hypothyroidism taking levothyroxine 50 mcg daily, due for related lab testing.  She denies any specific complaints at this time other than has had some trouble at times with some insomnia with her mind racing waking up.  She has taken some Unisom as well as melatonin over-the-counter with only variable results.  Denies currently being anxious or depressed though she has had problems in the past with some anxiety and panic attacks.  She also had problems in the not too distant past with some arthralgias and myalgias which had prompted a negative rheumatological work-up and ultimate referral to rheumatology with no autoimmune diagnosis made.  Good energy.  No other specific complaints.  She is current with her gynecological care with Dr. Phillips with having had a normal Pap smear in 2022 and normal mammogram in 2022.  She is due for a screening colonoscopy with Dr. Thompson, her last study that I can document from .  Not having any GI related problems.  No other specific concerns at this time.      Subjective      Review of Systems:   Review of Systems    Past Medical History, Social History, Family History and Care Team were all reviewed with patient and updated as appropriate.     Medications:     Current Outpatient Medications:   •  fluconazole (DIFLUCAN) 150 MG tablet, TAKE ONE TABLET WEEKLY, Disp: 4 tablet, Rfl: 5  •  levothyroxine (SYNTHROID, LEVOTHROID) 50 MCG tablet, Take 1 tablet by mouth Daily., Disp: 7 tablet, Rfl: 0  •  Multiple Vitamins-Minerals (MULTIVITAMIN & MINERAL PO), Take  by mouth., Disp: , Rfl:   •  Omega-3 Fatty Acids (FISH  OIL) 1000 MG capsule capsule, Take  by mouth Daily With Breakfast., Disp: , Rfl:     Allergies:   Allergies   Allergen Reactions   • Biaxin [Clarithromycin]        Immunizations:  Health Maintenance Summary          Overdue - COVID-19 Vaccine (1) Overdue - never done    No completion, postpone, or frequency change history exists for this topic.          Ordered - TDAP/TD VACCINES (1 - Tdap) Ordered on 2/22/2023    No completion, postpone, or frequency change history exists for this topic.          Ordered - HEPATITIS C SCREENING (Once) Ordered on 2/22/2023    No completion, postpone, or frequency change history exists for this topic.          Overdue - ANNUAL PHYSICAL (Yearly) Overdue - never done    No completion, postpone, or frequency change history exists for this topic.          Overdue - COLORECTAL CANCER SCREENING (COLONOSCOPY - Every 10 Years) Order placed this encounter    10/29/2009  COLONOSCOPY (Done - Dr. Thompson, details unknown)          Overdue - ZOSTER VACCINE (1 of 2) Overdue - never done    No completion, postpone, or frequency change history exists for this topic.          Overdue - INFLUENZA VACCINE (Yearly - August to March) Overdue - never done    No completion, postpone, or frequency change history exists for this topic.          Ordered - MAMMOGRAM (Every 2 Years) Ordered on 4/20/2022 04/20/2022  Mammo Screening Digital Tomosynthesis Bilateral With CAD    03/04/2021  Mammo Diagnostic Digital Tomosynthesis Left With CAD    02/10/2021  Mammo Screening Digital Tomosynthesis Bilateral With CAD    12/18/2019  Mammo Screening Digital Tomosynthesis Bilateral With CAD    08/29/2018  Mammo Screening Digital Tomosynthesis Bilateral With CAD    Only the first 5 history entries have been loaded, but more history exists.          Ordered - PAP SMEAR (Every 3 Years) Ordered on 4/26/2022 04/25/2022  PAP SMEAR SCANNED    02/11/2021  Pap IG, HPV-hr          Pneumococcal Vaccine 0-64 (Series  Information) Aged Out    No completion, postpone, or frequency change history exists for this topic.                 Orders Placed This Encounter   Procedures   • Tdap Vaccine Greater Than or Equal To 8yo IM        Colorectal Screenin, repeat study pending  Last Completed Colonoscopy     This patient has no relevant Health Maintenance data.        Pap: 2022, normal  Last Completed Pap Smear          Ordered - PAP SMEAR (Every 3 Years) Ordered on 2022  PAP SMEAR SCANNED    2021  Pap IG, HPV-hr               Mammogram: 2022, normal  Last Completed Mammogram          Ordered - MAMMOGRAM (Every 2 Years) Ordered on 2022  Mammo Screening Digital Tomosynthesis Bilateral With CAD    2021  Mammo Diagnostic Digital Tomosynthesis Left With CAD    02/10/2021  Mammo Screening Digital Tomosynthesis Bilateral With CAD    2019  Mammo Screening Digital Tomosynthesis Bilateral With CAD    2018  Mammo Screening Digital Tomosynthesis Bilateral With CAD    Only the first 5 history entries have been loaded, but more history exists.                 CT for Smoker (Age 50-80, 20 pk yr):   N/A  Bone Density/DEXA (Age 65 or high risk): N/A  Hep C (Age 18-79 once): Pending  HIV (Age 15-65 once): No results found for: HIV1X2 pending  A1c: Pending  Hemoglobin A1C   Date Value Ref Range Status   02/10/2021 4.95 4.80 - 5.60 % Final      Lipid panel:  No results found for: LIPIDEXCLUSI    The 10-year ASCVD risk score (Lowell DK, et al., 2019) is: 0.7%    Values used to calculate the score:      Age: 52 years      Sex: Female      Is Non- : No      Diabetic: No      Tobacco smoker: No      Systolic Blood Pressure: 120 mmHg      Is BP treated: No      HDL Cholesterol: 66 mg/dL      Total Cholesterol: 139 mg/dL      Tobacco Use: Low Risk    • Smoking Tobacco Use: Never   • Smokeless Tobacco Use: Never   • Passive Exposure: Not on file       Social  "History     Substance and Sexual Activity   Alcohol Use Yes    Comment: Socially        Social History     Substance and Sexual Activity   Drug Use Never        Diet/Physical activity: Healthy diet, exercises    Sexual Health: Not requiring contraception, not attempting pregnancy   Menopause: Perimenopausal, no menses  Menstrual Cycles: None, last menstrual cycle: N/A    Depression: PHQ-2 Depression Screening  PHQ-9 Total Score: 0       Objective     Physical Exam:  Vital Signs:   Vitals:    02/22/23 1102   BP: 120/76   Pulse: 77   Temp: 97 °F (36.1 °C)   TempSrc: Temporal   SpO2: 97%   Weight: 58.5 kg (129 lb)   Height: 154.9 cm (61\")     Body mass index is 24.37 kg/m².     Physical Exam  Vitals and nursing note reviewed.   Constitutional:       Appearance: Normal appearance. She is normal weight.      Comments: Pleasant, healthy, slender, well-groomed and well spoken   HENT:      Head: Normocephalic and atraumatic.      Right Ear: Tympanic membrane, ear canal and external ear normal.      Left Ear: Tympanic membrane, ear canal and external ear normal.      Nose: Nose normal.      Mouth/Throat:      Mouth: Mucous membranes are moist.      Pharynx: Oropharynx is clear.      Comments: Good dentition  Eyes:      Extraocular Movements: Extraocular movements intact.      Conjunctiva/sclera: Conjunctivae normal.      Pupils: Pupils are equal, round, and reactive to light.   Neck:      Vascular: No carotid bruit.      Comments: No cervical, periclavicular, or inguinal adenopathy  Cardiovascular:      Rate and Rhythm: Normal rate and regular rhythm.      Pulses: Normal pulses.      Heart sounds: Normal heart sounds. No murmur heard.    No friction rub. No gallop.      Comments: 2+ carotids without bruits, 2+ radial pulses, 2+ femoral pulses without bruits, 2+ bipedal pulses with good perfusion and no edema  Pulmonary:      Effort: Pulmonary effort is normal.      Breath sounds: Normal breath sounds.      Comments: No cough " wheeze or dyspnea  Chest:   Breasts:     Right: Normal.      Left: Normal.      Comments: Breast exam deferred  Abdominal:      General: Abdomen is flat. Bowel sounds are normal.      Palpations: Abdomen is soft.      Comments: Nontender nondistended with no organomegaly or masses   Genitourinary:     Comments: Breast, pelvic and rectal exams deferred as routinely performed by gynecologist with no acute complaints  Musculoskeletal:         General: No swelling, tenderness or deformity. Normal range of motion.      Cervical back: Normal range of motion and neck supple. No rigidity or tenderness.      Right lower leg: No edema.      Left lower leg: No edema.   Lymphadenopathy:      Cervical: No cervical adenopathy.   Skin:     General: Skin is warm and dry.      Capillary Refill: Capillary refill takes less than 2 seconds.      Findings: No lesion or rash.   Neurological:      General: No focal deficit present.      Mental Status: She is alert and oriented to person, place, and time. Mental status is at baseline.      Cranial Nerves: No cranial nerve deficit.      Sensory: No sensory deficit.      Motor: No weakness.      Coordination: Coordination normal.   Psychiatric:         Mood and Affect: Mood normal.         Behavior: Behavior normal.         Thought Content: Thought content normal.         Judgment: Judgment normal.         POCT Results (if applicable);   Results for orders placed or performed in visit on 02/10/21   Comprehensive Metabolic Panel    Specimen: Blood   Result Value Ref Range    Glucose 83 65 - 99 mg/dL    BUN 14 6 - 20 mg/dL    Creatinine 0.72 0.57 - 1.00 mg/dL    Sodium 142 136 - 145 mmol/L    Potassium 4.3 3.5 - 5.2 mmol/L    Chloride 107 98 - 107 mmol/L    CO2 26.1 22.0 - 29.0 mmol/L    Calcium 9.4 8.6 - 10.5 mg/dL    Total Protein 6.7 6.0 - 8.5 g/dL    Albumin 4.30 3.50 - 5.20 g/dL    ALT (SGPT) 12 1 - 33 U/L    AST (SGOT) 14 1 - 32 U/L    Alkaline Phosphatase 87 39 - 117 U/L    Total  Bilirubin 0.4 0.0 - 1.2 mg/dL    eGFR Non African Amer 86 >60 mL/min/1.73    Globulin 2.4 gm/dL    A/G Ratio 1.8 g/dL    BUN/Creatinine Ratio 19.4 7.0 - 25.0    Anion Gap 8.9 5.0 - 15.0 mmol/L   Hemoglobin A1c    Specimen: Blood   Result Value Ref Range    Hemoglobin A1C 4.95 4.80 - 5.60 %   Vitamin D 25 Hydroxy    Specimen: Blood   Result Value Ref Range    25 Hydroxy, Vitamin D 50.1 30.0 - 100.0 ng/ml   TSH    Specimen: Blood   Result Value Ref Range    TSH 2.290 0.270 - 4.200 uIU/mL   Lipid Panel    Specimen: Blood   Result Value Ref Range    Total Cholesterol 139 0 - 200 mg/dL    Triglycerides 24 0 - 150 mg/dL    HDL Cholesterol 66 (H) 40 - 60 mg/dL    LDL Cholesterol  66 0 - 100 mg/dL    VLDL Cholesterol 7 5 - 40 mg/dL    LDL/HDL Ratio 1.03    Follicle Stimulating Hormone    Specimen: Blood   Result Value Ref Range    FSH 56.70 mIU/mL   CBC Auto Differential    Specimen: Blood   Result Value Ref Range    WBC 4.89 3.40 - 10.80 10*3/mm3    RBC 4.15 3.77 - 5.28 10*6/mm3    Hemoglobin 13.4 12.0 - 15.9 g/dL    Hematocrit 39.7 34.0 - 46.6 %    MCV 95.7 79.0 - 97.0 fL    MCH 32.3 26.6 - 33.0 pg    MCHC 33.8 31.5 - 35.7 g/dL    RDW 11.8 (L) 12.3 - 15.4 %    RDW-SD 41.5 37.0 - 54.0 fl    MPV 10.4 6.0 - 12.0 fL    Platelets 303 140 - 450 10*3/mm3    Neutrophil % 56.9 42.7 - 76.0 %    Lymphocyte % 32.9 19.6 - 45.3 %    Monocyte % 7.8 5.0 - 12.0 %    Eosinophil % 1.2 0.3 - 6.2 %    Basophil % 1.0 0.0 - 1.5 %    Immature Grans % 0.2 0.0 - 0.5 %    Neutrophils, Absolute 2.78 1.70 - 7.00 10*3/mm3    Lymphocytes, Absolute 1.61 0.70 - 3.10 10*3/mm3    Monocytes, Absolute 0.38 0.10 - 0.90 10*3/mm3    Eosinophils, Absolute 0.06 0.00 - 0.40 10*3/mm3    Basophils, Absolute 0.05 0.00 - 0.20 10*3/mm3    Immature Grans, Absolute 0.01 0.00 - 0.05 10*3/mm3    nRBC 0.0 0.0 - 0.2 /100 WBC        Procedures    Assessment / Plan      Assessment/Plan:   Diagnoses and all orders for this visit:    1. Encounter for general adult medical  examination with abnormal findings (Primary)  -     Tdap Vaccine Greater Than or Equal To 8yo IM  -     Ambulatory Referral For Screening Colonoscopy  -     TSH; Future  -     T4, Free; Future  -     CBC & Differential; Future  -     Comprehensive Metabolic Panel; Future  -     Lipid Panel; Future  -     Hepatitis C Antibody; Future  -     HIV-1 / O / 2 Ag / Antibody 4th Generation; Future  -     Hemoglobin A1c; Future  Generally very healthy 52-year-old white female.  Followed regularly by gynecology, Dr. Phillips, up-to-date with Pap smears and mammogram, referring to Dr. Thompson to update her colonoscopy  2. Acquired hypothyroidism  -     TSH; Future  -     T4, Free; Future  Taking levothyroxine 50 mcg daily.  Update thyroid function studies to ensure still therapeutic dosing.  3. Insomnia, unspecified type  Intermittent, discussed sleep hygiene techniques including regular exercise but not before bedtime, reading, avoiding use of iPads or computers prior to bedtime, suggested use of melatonin or Benadryl type products as needed.  Advised against use of prescription sleep aids given potential for addiction.  4. Preop examination  -     CBC & Differential; Future  -     Comprehensive Metabolic Panel; Future  Patient to be referred for colonoscopy.  Check screening labs.  5. Screen for colon cancer  -     Ambulatory Referral For Screening Colonoscopy  Referred to Dr. Thompson of GI for update of colonoscopy, with the last study that I can find in her prior records from 2009.  6. Screening for HIV (human immunodeficiency virus)  -     HIV-1 / O / 2 Ag / Antibody 4th Generation; Future  Check related screen.  7. Need for hepatitis C screening test  -     Hepatitis C Antibody; Future  Check related screen.  8. Need for Tdap vaccination  -     Tdap Vaccine Greater Than or Equal To 8yo IM  Advised patient obtain COVID-19 vaccine series and when appropriate also yearly flu vaccine, citing safety and efficacy        Healthcare Maintenance:  Counseling provided based on age appropriate USPSTF guidelines.  BMI is within normal parameters. No other follow-up for BMI required.    Joycelyn Cuellar Ingrid voices understanding and acceptance of this advice and will call back with any further questions or concerns. AVS with preventive healthcare tips printed for patient.     Vaccine Counseling:  “Discussed risks/benefits to vaccination, reviewed components of the vaccine, discussed VIS, discussed informed consent, informed consent obtained. Patient/Parent was allowed to accept or refuse vaccine. Questions answered to satisfactory state of patient/Parent. We reviewed typical age appropriate and seasonally appropriate vaccinations. Reviewed immunization history and updated state vaccination form as needed. Patient was counseled on Tdap      Follow Up:   Return in about 1 year (around 2/22/2024) for Annual physical, Labs today.      At [facility], we believe that sharing information builds trust and better relationships. You are receiving this note because you recently visited [facility]. It is possible you will see health information before a provider has talked with you about it. This kind of information can be easy to misunderstand. To help you fully understand what it means for your health, we urge you to discuss this note with your provider.    Lorenzo Gibson MD  [facility] [place]

## 2023-02-22 NOTE — PROGRESS NOTES
Venipuncture Blood Specimen Collection  Venipuncture performed in left arm by Marylin Singleton MA with good hemostasis. Patient tolerated the procedure well without complications.   02/22/23   Marylin Singleton MA

## 2023-02-23 ENCOUNTER — TELEPHONE (OUTPATIENT)
Dept: FAMILY MEDICINE CLINIC | Facility: CLINIC | Age: 53
End: 2023-02-23
Payer: COMMERCIAL

## 2023-02-23 LAB
ALBUMIN SERPL-MCNC: 4.7 G/DL (ref 3.8–4.9)
ALBUMIN/GLOB SERPL: 2 {RATIO} (ref 1.2–2.2)
ALP SERPL-CCNC: 119 IU/L (ref 44–121)
ALT SERPL-CCNC: 15 IU/L (ref 0–32)
AST SERPL-CCNC: 22 IU/L (ref 0–40)
BASOPHILS # BLD AUTO: 0.1 X10E3/UL (ref 0–0.2)
BASOPHILS NFR BLD AUTO: 1 %
BILIRUB SERPL-MCNC: 0.2 MG/DL (ref 0–1.2)
BUN SERPL-MCNC: 12 MG/DL (ref 6–24)
BUN/CREAT SERPL: 15 (ref 9–23)
CALCIUM SERPL-MCNC: 10 MG/DL (ref 8.7–10.2)
CHLORIDE SERPL-SCNC: 103 MMOL/L (ref 96–106)
CHOLEST SERPL-MCNC: 170 MG/DL (ref 100–199)
CO2 SERPL-SCNC: 22 MMOL/L (ref 20–29)
CREAT SERPL-MCNC: 0.79 MG/DL (ref 0.57–1)
EGFRCR SERPLBLD CKD-EPI 2021: 90 ML/MIN/1.73
EOSINOPHIL # BLD AUTO: 0.1 X10E3/UL (ref 0–0.4)
EOSINOPHIL NFR BLD AUTO: 2 %
ERYTHROCYTE [DISTWIDTH] IN BLOOD BY AUTOMATED COUNT: 11.9 % (ref 11.7–15.4)
GLOBULIN SER CALC-MCNC: 2.3 G/DL (ref 1.5–4.5)
GLUCOSE SERPL-MCNC: 105 MG/DL (ref 70–99)
HBA1C MFR BLD: 5.6 % (ref 4.8–5.6)
HCT VFR BLD AUTO: 44.7 % (ref 34–46.6)
HCV IGG SERPL QL IA: NON REACTIVE
HDLC SERPL-MCNC: 65 MG/DL
HGB BLD-MCNC: 14.6 G/DL (ref 11.1–15.9)
HIV 1+2 AB+HIV1 P24 AG SERPL QL IA: NON REACTIVE
IMM GRANULOCYTES # BLD AUTO: 0 X10E3/UL (ref 0–0.1)
IMM GRANULOCYTES NFR BLD AUTO: 0 %
LDLC SERPL CALC-MCNC: 95 MG/DL (ref 0–99)
LYMPHOCYTES # BLD AUTO: 1.4 X10E3/UL (ref 0.7–3.1)
LYMPHOCYTES NFR BLD AUTO: 29 %
MCH RBC QN AUTO: 30.6 PG (ref 26.6–33)
MCHC RBC AUTO-ENTMCNC: 32.7 G/DL (ref 31.5–35.7)
MCV RBC AUTO: 94 FL (ref 79–97)
MONOCYTES # BLD AUTO: 0.3 X10E3/UL (ref 0.1–0.9)
MONOCYTES NFR BLD AUTO: 6 %
NEUTROPHILS # BLD AUTO: 2.9 X10E3/UL (ref 1.4–7)
NEUTROPHILS NFR BLD AUTO: 62 %
PLATELET # BLD AUTO: 353 X10E3/UL (ref 150–450)
POTASSIUM SERPL-SCNC: 4.6 MMOL/L (ref 3.5–5.2)
PROT SERPL-MCNC: 7 G/DL (ref 6–8.5)
RBC # BLD AUTO: 4.77 X10E6/UL (ref 3.77–5.28)
SODIUM SERPL-SCNC: 139 MMOL/L (ref 134–144)
T4 FREE SERPL-MCNC: 1.16 NG/DL (ref 0.82–1.77)
TRIGL SERPL-MCNC: 48 MG/DL (ref 0–149)
TSH SERPL DL<=0.005 MIU/L-ACNC: 2.85 UIU/ML (ref 0.45–4.5)
VLDLC SERPL CALC-MCNC: 10 MG/DL (ref 5–40)
WBC # BLD AUTO: 4.7 X10E3/UL (ref 3.4–10.8)

## 2023-02-23 NOTE — TELEPHONE ENCOUNTER
Phone conversation with patient discussing results of lab testing from yesterday, 2/22/2023 as follows:    TSH, free T4, CBC, CMP, fasting lipid profile, hemoglobin A1c, HIV, and hepatitis C all satisfactory or normal.    Assessment/medical  1.  Satisfactory lab testing.  2.  Hypothyroidism, compensated.  Continue levothyroxine 50 mcg daily as prescribed.  Repeat testing in 1 year  3.  Patient indicates she has her colonoscopy scheduled for early 4/2023 with Dr. Yanes

## 2023-02-25 DIAGNOSIS — N76.0 RECURRENT VAGINITIS: ICD-10-CM

## 2023-02-27 ENCOUNTER — DOCUMENTATION (OUTPATIENT)
Dept: OBSTETRICS AND GYNECOLOGY | Facility: CLINIC | Age: 53
End: 2023-02-27
Payer: COMMERCIAL

## 2023-02-27 RX ORDER — FLUCONAZOLE 150 MG/1
TABLET ORAL
Qty: 4 TABLET | Refills: 5 | OUTPATIENT
Start: 2023-02-27

## 2023-02-27 NOTE — PROGRESS NOTES
Patients last appt 4/20/2022. There is no future appt scheduled. Denied Diflucan until patient schedules an appt.

## 2023-03-03 DIAGNOSIS — E03.9 HYPOTHYROIDISM, UNSPECIFIED TYPE: ICD-10-CM

## 2023-03-03 RX ORDER — LEVOTHYROXINE SODIUM 0.05 MG/1
50 TABLET ORAL DAILY
Qty: 30 TABLET | Refills: 3 | Status: SHIPPED | OUTPATIENT
Start: 2023-03-03

## 2023-03-06 ENCOUNTER — TRANSCRIBE ORDERS (OUTPATIENT)
Dept: ADMINISTRATIVE | Facility: HOSPITAL | Age: 53
End: 2023-03-06
Payer: COMMERCIAL

## 2023-03-06 DIAGNOSIS — Z12.31 VISIT FOR SCREENING MAMMOGRAM: Primary | ICD-10-CM

## 2023-03-16 ENCOUNTER — TELEPHONE (OUTPATIENT)
Dept: OBSTETRICS AND GYNECOLOGY | Facility: CLINIC | Age: 53
End: 2023-03-16
Payer: COMMERCIAL

## 2023-03-16 DIAGNOSIS — N76.0 RECURRENT VAGINITIS: ICD-10-CM

## 2023-03-16 RX ORDER — FLUCONAZOLE 150 MG/1
TABLET ORAL
Qty: 4 TABLET | Refills: 1 | Status: SHIPPED | OUTPATIENT
Start: 2023-03-16

## 2023-04-13 ENCOUNTER — AMBULATORY SURGICAL CENTER (OUTPATIENT)
Dept: URBAN - METROPOLITAN AREA SURGERY 10 | Facility: SURGERY | Age: 53
End: 2023-04-13
Payer: COMMERCIAL

## 2023-04-13 ENCOUNTER — OFFICE (OUTPATIENT)
Dept: URBAN - METROPOLITAN AREA PATHOLOGY 4 | Facility: PATHOLOGY | Age: 53
End: 2023-04-13
Payer: COMMERCIAL

## 2023-04-13 DIAGNOSIS — Z86.010 PERSONAL HISTORY OF COLONIC POLYPS: ICD-10-CM

## 2023-04-13 DIAGNOSIS — D12.0 BENIGN NEOPLASM OF CECUM: ICD-10-CM

## 2023-04-13 DIAGNOSIS — K64.0 FIRST DEGREE HEMORRHOIDS: ICD-10-CM

## 2023-04-13 PROCEDURE — 45385 COLONOSCOPY W/LESION REMOVAL: CPT | Mod: 33 | Performed by: INTERNAL MEDICINE

## 2023-04-13 PROCEDURE — 88305 TISSUE EXAM BY PATHOLOGIST: CPT | Performed by: INTERNAL MEDICINE

## 2023-04-26 ENCOUNTER — HOSPITAL ENCOUNTER (OUTPATIENT)
Dept: MAMMOGRAPHY | Facility: HOSPITAL | Age: 53
Discharge: HOME | End: 2023-04-26
Admitting: OBSTETRICS & GYNECOLOGY
Payer: COMMERCIAL

## 2023-04-26 ENCOUNTER — OFFICE VISIT (OUTPATIENT)
Dept: OBSTETRICS AND GYNECOLOGY | Facility: CLINIC | Age: 53
End: 2023-04-26
Payer: COMMERCIAL

## 2023-04-26 VITALS
DIASTOLIC BLOOD PRESSURE: 80 MMHG | WEIGHT: 127 LBS | SYSTOLIC BLOOD PRESSURE: 110 MMHG | BODY MASS INDEX: 23.98 KG/M2 | HEIGHT: 61 IN

## 2023-04-26 DIAGNOSIS — Z30.431 IUD CHECK UP: ICD-10-CM

## 2023-04-26 DIAGNOSIS — N76.0 RECURRENT VAGINITIS: ICD-10-CM

## 2023-04-26 DIAGNOSIS — Z01.419 WOMEN'S ANNUAL ROUTINE GYNECOLOGICAL EXAMINATION: Primary | ICD-10-CM

## 2023-04-26 DIAGNOSIS — Z12.31 VISIT FOR SCREENING MAMMOGRAM: ICD-10-CM

## 2023-04-26 PROCEDURE — 77067 SCR MAMMO BI INCL CAD: CPT

## 2023-04-26 PROCEDURE — 77063 BREAST TOMOSYNTHESIS BI: CPT

## 2023-04-26 PROCEDURE — 99396 PREV VISIT EST AGE 40-64: CPT | Performed by: OBSTETRICS & GYNECOLOGY

## 2023-04-26 RX ORDER — FLUCONAZOLE 150 MG/1
TABLET ORAL
Qty: 4 TABLET | Refills: 11 | Status: SHIPPED | OUTPATIENT
Start: 2023-04-26

## 2023-04-26 NOTE — PROGRESS NOTES
Gynecologic Annual Exam Note          GYN Annual Exam     Gynecologic Exam        Subjective     HPI  Joycelyn Quintero is a 52 y.o. female, , who presents for annual well woman exam as a established patient . No LMP recorded. Patient has had an implant..  Patient reports problems with: hot flashes and cramping.  Her periods are absent secondary to birth control.. She reports dysmenorrhea is none. Partner Status: Marital Status: single. She is is sexually active. She has not had new partners.. STD testing recommendations have been explained to the patient and she does not desire STD testing. There were no changes to her medical or surgical history since her last visit..       Additional OB/GYN History   Current contraception: contraceptive methods: IUD.  Insertion date:  Mirena  Desires to: continue contraception    Last Pap : 2022. Result: negative. HPV: not done.   Last Completed Pap Smear          PAP SMEAR (Every 3 Years) Next due on 2022  PAP SMEAR SCANNED    2021  Pap IG, HPV-hr              History of abnormal Pap smear: no  Family history of uterine, colon, breast, or ovarian cancer: no  Performs monthly Self-Breast Exam: yes  Last mammogram: 2022. [facility] at [facility].    Last Completed Mammogram          Scheduled - MAMMOGRAM (Every 2 Years) Scheduled for 2022  Mammo Screening Digital Tomosynthesis Bilateral With CAD    2021  Mammo Diagnostic Digital Tomosynthesis Left With CAD    02/10/2021  Mammo Screening Digital Tomosynthesis Bilateral With CAD    2019  Mammo Screening Digital Tomosynthesis Bilateral With CAD    2018  Mammo Screening Digital Tomosynthesis Bilateral With CAD    Only the first 5 history entries have been loaded, but more history exists.                History of abnormal mammogram: no    Colonoscopy: has had a colonoscopy years year(s) ago. Dr. Kendall Thompson  Exercises Regularly: yes  Feelings of  Anxiety or Depression: no  Tobacco Usage?: No       Current Outpatient Medications:   •  fluconazole (DIFLUCAN) 150 MG tablet, Take one tablet weekly, Disp: 4 tablet, Rfl: 11  •  levothyroxine (SYNTHROID, LEVOTHROID) 50 MCG tablet, Take 1 tablet by mouth Daily., Disp: 30 tablet, Rfl: 3  •  Multiple Vitamins-Minerals (MULTIVITAMIN & MINERAL PO), Take  by mouth., Disp: , Rfl:   •  Omega-3 Fatty Acids (FISH OIL) 1000 MG capsule capsule, Take  by mouth Daily With Breakfast., Disp: , Rfl:      Patient denies the need for medication refills today.    OB History        2    Para   2    Term   2            AB        Living   2       SAB        IAB        Ectopic        Molar        Multiple        Live Births   2                Past Medical History:   Diagnosis Date   • Disease of thyroid gland    • DVT (deep venous thrombosis) 1990    Left Leg   • Hypothyroid    • Mitral valve prolapse    • Pemphigus vulgaris         Past Surgical History:   Procedure Laterality Date   • COLONOSCOPY  10/29/2009    Dr. Thompson   • CYSTOSCOPY     • DILATATION AND CURETTAGE     • ENDOSCOPY  2007    Dr. Thompson, duodenal diverticulum, gastric atrophy, Schatzki's ring   • ERCP         Health Maintenance   Topic Date Due   • COVID-19 Vaccine (1) Never done   • COLORECTAL CANCER SCREENING  10/29/2019   • ZOSTER VACCINE (1 of 2) Never done   • INFLUENZA VACCINE  2023   • ANNUAL PHYSICAL  2024   • MAMMOGRAM  2024   • Annual Gynecologic Pelvic and Breast Exam  2024   • PAP SMEAR  2025   • TDAP/TD VACCINES (2 - Td or Tdap) 2033   • HEPATITIS C SCREENING  Completed   • Pneumococcal Vaccine 0-64  Aged Out       The additional following portions of the patient's history were reviewed and updated as appropriate: allergies, current medications, past family history, past medical history, past social history, past surgical history and problem list.    Review of Systems   Constitutional: Negative for  "chills, fatigue, unexpected weight gain and unexpected weight loss.   HENT: Negative for sore throat and swollen glands.    Respiratory: Negative for cough and shortness of breath.    Cardiovascular: Negative for chest pain, palpitations and leg swelling.   Gastrointestinal: Negative for abdominal distention, abdominal pain, blood in stool, constipation, diarrhea and nausea.   Endocrine: Negative for cold intolerance and heat intolerance.   Genitourinary: Negative for breast discharge, breast lump, frequency, hematuria, pelvic pain, vaginal bleeding, vaginal discharge and vaginal pain.   Musculoskeletal: Negative for gait problem and myalgias.   Allergic/Immunologic: Negative for immunocompromised state.   Neurological: Negative for dizziness, headache and confusion.   Psychiatric/Behavioral: Negative for suicidal ideas and depressed mood.         I have reviewed and agree with the HPI, ROS, and historical information as entered above. Karen Phillips MD      Objective   /80   Ht 154.9 cm (61\")   Wt 57.6 kg (127 lb)   BMI 24.00 kg/m²     Physical Exam  Vitals and nursing note reviewed. Exam conducted with a chaperone present.   Constitutional:       Appearance: She is well-developed.   HENT:      Head: Normocephalic and atraumatic.   Eyes:      Pupils: Pupils are equal, round, and reactive to light.   Neck:      Thyroid: No thyroid mass or thyromegaly.   Pulmonary:      Effort: Pulmonary effort is normal. No retractions.   Chest:      Chest wall: No mass.   Breasts:     Right: Normal. No mass, nipple discharge, skin change or tenderness.      Left: Normal. No mass, nipple discharge, skin change or tenderness.   Abdominal:      General: Bowel sounds are normal.      Palpations: Abdomen is soft. Abdomen is not rigid. There is no mass.      Tenderness: There is no abdominal tenderness. There is no guarding.      Hernia: No hernia is present. There is no hernia in the left inguinal area or right inguinal area. "   Genitourinary:     Exam position: Lithotomy position.      Pubic Area: No rash.       Labia:         Right: No rash, tenderness or lesion.         Left: No rash, tenderness or lesion.       Urethra: No urethral pain or urethral swelling.      Vagina: Normal. No vaginal discharge or lesions.      Cervix: No cervical motion tenderness, discharge, lesion or cervical bleeding.      Uterus: Normal. Not enlarged, not fixed and not tender.       Adnexa:         Right: No mass, tenderness or fullness.          Left: No mass, tenderness or fullness.        Rectum: No external hemorrhoid.   Musculoskeletal:      Cervical back: Normal range of motion. No muscular tenderness.      Right lower leg: No edema.      Left lower leg: No edema.   Skin:     General: Skin is warm and dry.   Neurological:      Mental Status: She is alert and oriented to person, place, and time.      Motor: Motor function is intact.   Psychiatric:         Mood and Affect: Mood and affect normal.         Behavior: Behavior normal.     strings seen       Assessment and Plan    Problem List Items Addressed This Visit    None  Visit Diagnoses     Women's annual routine gynecological examination    -  Primary    Relevant Orders    Mammo Screening Digital Tomosynthesis Bilateral With CAD    IUD check up        Relevant Orders    Mammo Screening Digital Tomosynthesis Bilateral With CAD    Recurrent vaginitis        Relevant Medications    fluconazole (DIFLUCAN) 150 MG tablet    Other Relevant Orders    Mammo Screening Digital Tomosynthesis Bilateral With CAD          1. GYN annual well woman exam.   2. Pap guidelines reviewed.  3. Reviewed monthly self breast exams.  Instructed to call with lumps, pain, or breast discharge.    4. Ordered Mammogram today  5. Reviewed exercise as a preventative health measures.   6. Reccommended Flu Vaccine in Fall of each year.  7. RTC in 1 year or PRN with problems.  8. Return in about 1 year (around 4/26/2024) for Annual  physical.     Karen Phillips MD  04/26/2023

## 2023-05-01 ENCOUNTER — TRANSCRIBE ORDERS (OUTPATIENT)
Dept: ADMINISTRATIVE | Facility: HOSPITAL | Age: 53
End: 2023-05-01
Payer: COMMERCIAL

## 2023-05-01 DIAGNOSIS — Z12.31 VISIT FOR SCREENING MAMMOGRAM: Primary | ICD-10-CM

## 2023-05-01 LAB — REF LAB TEST METHOD: NORMAL

## 2023-08-24 DIAGNOSIS — E03.9 HYPOTHYROIDISM, UNSPECIFIED TYPE: ICD-10-CM

## 2023-08-24 RX ORDER — LEVOTHYROXINE SODIUM 0.05 MG/1
50 TABLET ORAL DAILY
Qty: 30 TABLET | Refills: 3 | Status: SHIPPED | OUTPATIENT
Start: 2023-08-24

## 2023-11-13 DIAGNOSIS — E03.9 HYPOTHYROIDISM, UNSPECIFIED TYPE: ICD-10-CM

## 2023-11-13 RX ORDER — LEVOTHYROXINE SODIUM 0.05 MG/1
50 TABLET ORAL DAILY
Qty: 90 TABLET | Refills: 1 | Status: SHIPPED | OUTPATIENT
Start: 2023-11-13

## 2024-01-02 ENCOUNTER — OFFICE VISIT (OUTPATIENT)
Dept: FAMILY MEDICINE CLINIC | Facility: CLINIC | Age: 54
End: 2024-01-02
Payer: COMMERCIAL

## 2024-01-02 VITALS — BODY MASS INDEX: 24.52 KG/M2 | HEIGHT: 61 IN | TEMPERATURE: 98.8 F | WEIGHT: 129.9 LBS

## 2024-01-02 DIAGNOSIS — J06.9 UPPER RESPIRATORY TRACT INFECTION, UNSPECIFIED TYPE: Primary | ICD-10-CM

## 2024-01-02 LAB
EXPIRATION DATE: NORMAL
FLUAV AG UPPER RESP QL IA.RAPID: NOT DETECTED
FLUBV AG UPPER RESP QL IA.RAPID: NOT DETECTED
INTERNAL CONTROL: NORMAL
Lab: NORMAL
SARS-COV-2 AG UPPER RESP QL IA.RAPID: NOT DETECTED

## 2024-01-02 PROCEDURE — 99213 OFFICE O/P EST LOW 20 MIN: CPT | Performed by: NURSE PRACTITIONER

## 2024-01-02 PROCEDURE — 87428 SARSCOV & INF VIR A&B AG IA: CPT | Performed by: NURSE PRACTITIONER

## 2024-01-02 RX ORDER — METHYLPREDNISOLONE 4 MG/1
TABLET ORAL
Qty: 21 TABLET | Refills: 0 | Status: SHIPPED | OUTPATIENT
Start: 2024-01-02

## 2024-01-02 RX ORDER — FLUTICASONE PROPIONATE 50 MCG
2 SPRAY, SUSPENSION (ML) NASAL DAILY
Qty: 11.1 ML | Refills: 0 | Status: SHIPPED | OUTPATIENT
Start: 2024-01-02

## 2024-01-02 RX ORDER — CEFDINIR 300 MG/1
300 CAPSULE ORAL 2 TIMES DAILY
Qty: 20 CAPSULE | Refills: 0 | Status: SHIPPED | OUTPATIENT
Start: 2024-01-02

## 2024-01-02 RX ORDER — HYDROCODONE POLISTIREX AND CHLORPHENIRAMINE POLISTIREX 10; 8 MG/5ML; MG/5ML
5 SUSPENSION, EXTENDED RELEASE ORAL EVERY 12 HOURS PRN
Qty: 50 ML | Refills: 0 | Status: SHIPPED | OUTPATIENT
Start: 2024-01-02

## 2024-01-02 NOTE — ASSESSMENT & PLAN NOTE
presents today for acute complaints of cough, headache, nasal congestion and generalized bodyaches.  Patient reports her symptoms began 5 days ago.  They have progressively worsened during that time.  She denies any fever, chills.  She states she has been taking tylenol cold and cough, night and day without very much benefit.  She has now developed a deep chest cough.  She notes some intermittent wheezing, none on physical exam today.  She also endorses right ear pain and congestion.  Patient has tested negative for COVID and flu in office today.  To exam findings and chief complaint is consistent with URI.  Patient reports she was given tussionex in past by previous PCP, Dr. Adilson Gibson and would like the same medication for her cough.  -Treat URI empirically with cefdinir.  Patient also being provided with Tussionex prescription per her request.  Also being given Medrol Dosepak to assist with sinus inflammation as well as bronchial inflammation.  She is also advised to use daily Flonase for at least 2 weeks to help with sinus inflammation.  If no improvement in the next 7 to 14 days should follow-up with regular PCP, Dr. Lorenzo Gibson.

## 2024-01-02 NOTE — PROGRESS NOTES
Office Note     Name: Joycelyn Quintero    : 1970     MRN: [id number]     Chief Complaint  Cough, Headache, Nasal Congestion, and Generalized Body Aches    Subjective     History of Present Illness:  Joycelyn Quintero is a 53 y.o. female who presents today for acute complaints of cough, headache, nasal congestion and generalized bodyaches.  Patient reports her symptoms began 5 days ago.  They have progressively worsened during that time.  She denies any fever, chills.  She states she has been taking tylenol cold and cough, night and day without very much benefit.  She has now developed a deep chest cough.  She notes some intermittent wheezing, none on physical exam today.  She also endorses right ear pain and congestion.  She has no further complaints or concerns today    Review of Systems   Constitutional:  Positive for fatigue. Negative for chills and fever.   HENT:  Positive for congestion, ear pain, postnasal drip, sinus pressure and sore throat. Negative for ear discharge and trouble swallowing.    Respiratory:  Positive for cough and chest tightness. Negative for shortness of breath and wheezing.    Cardiovascular:  Negative for chest pain, palpitations and leg swelling.   Gastrointestinal:  Negative for abdominal pain, constipation, diarrhea, nausea and vomiting.   Musculoskeletal:  Positive for myalgias.   Neurological:  Positive for headache. Negative for dizziness, weakness and light-headedness.       Objective     Past Medical History:   Diagnosis Date    Disease of thyroid gland     DVT (deep venous thrombosis)     Left Leg    Hypothyroid     Mitral valve prolapse     Pemphigus vulgaris      Past Surgical History:   Procedure Laterality Date    COLONOSCOPY  10/29/2009    Dr. Thompson    CYSTOSCOPY      DILATATION AND CURETTAGE      ENDOSCOPY  2007    Dr. Thompson, duodenal diverticulum, gastric atrophy, Schatzki's ring    ERCP       Family History   Problem Relation Age of  "Onset    Diabetes Mother     Breast cancer Neg Hx     Ovarian cancer Neg Hx     Uterine cancer Neg Hx     Colon cancer Neg Hx        Vital Signs  Temp 98.8 °F (37.1 °C) (Temporal)   Ht 154.9 cm (61\")   Wt 58.9 kg (129 lb 14.4 oz)   BMI 24.54 kg/m²   Estimated body mass index is 24.54 kg/m² as calculated from the following:    Height as of this encounter: 154.9 cm (61\").    Weight as of this encounter: 58.9 kg (129 lb 14.4 oz).    Physical Exam  Vitals reviewed.   Constitutional:       Appearance: Normal appearance.   HENT:      Head: Normocephalic and atraumatic.      Right Ear: Ear canal and external ear normal.      Left Ear: Ear canal and external ear normal.      Ears:      Comments: Bilateral serous effusion     Nose: Congestion present.      Right Turbinates: Swollen.      Left Turbinates: Swollen.      Mouth/Throat:      Mouth: Mucous membranes are moist.      Pharynx: Posterior oropharyngeal erythema present.   Eyes:      Conjunctiva/sclera: Conjunctivae normal.   Cardiovascular:      Rate and Rhythm: Normal rate and regular rhythm.      Pulses: Normal pulses.      Heart sounds: Normal heart sounds.   Pulmonary:      Effort: Pulmonary effort is normal.      Breath sounds: Rhonchi present. No wheezing.   Abdominal:      General: Bowel sounds are normal.      Palpations: Abdomen is soft.   Musculoskeletal:      Cervical back: Neck supple.   Skin:     General: Skin is warm and dry.   Neurological:      Mental Status: She is alert and oriented to person, place, and time.               POCT Results (if applicable):  Results for orders placed or performed in visit on 01/02/24   POCT SARS-CoV-2 + Flu Antigen YOUSIF    Specimen: Swab   Result Value Ref Range    SARS Antigen Not Detected Not Detected, Presumptive Negative    Influenza A Antigen YOUSIF Not Detected Not Detected    Influenza B Antigen YOUSIF Not Detected Not Detected    Internal Control Passed Passed    Lot Number [id number]     Expiration Date 12/04/2024  "            Assessment and Plan     Diagnoses and all orders for this visit:    1. Upper respiratory tract infection, unspecified type (Primary)  Assessment & Plan:  presents today for acute complaints of cough, headache, nasal congestion and generalized bodyaches.  Patient reports her symptoms began 5 days ago.  They have progressively worsened during that time.  She denies any fever, chills.  She states she has been taking tylenol cold and cough, night and day without very much benefit.  She has now developed a deep chest cough.  She notes some intermittent wheezing, none on physical exam today.  She also endorses right ear pain and congestion.  Patient has tested negative for COVID and flu in office today.  To exam findings and chief complaint is consistent with URI.  Patient reports she was given tussionex in past by previous PCP, Dr. Adilson Gibson and would like the same medication for her cough.  -Treat URI empirically with cefdinir.  Patient also being provided with Tussionex prescription per her request.  Also being given Medrol Dosepak to assist with sinus inflammation as well as bronchial inflammation.  She is also advised to use daily Flonase for at least 2 weeks to help with sinus inflammation.  If no improvement in the next 7 to 14 days should follow-up with regular PCP, Dr. Lorenzo Gibson.    Orders:  -     fluticasone (FLONASE) 50 MCG/ACT nasal spray; 2 sprays into the nostril(s) as directed by provider Daily.  Dispense: 11.1 mL; Refill: 0  -     methylPREDNISolone (MEDROL) 4 MG dose pack; Take as directed on package instructions.  Dispense: 21 tablet; Refill: 0  -     cefdinir (OMNICEF) 300 MG capsule; Take 1 capsule by mouth 2 (Two) Times a Day.  Dispense: 20 capsule; Refill: 0  -     Hydrocod Philippe-Chlorphe Philippe ER (TUSSIONEX PENNKINETIC) 10-8 MG/5ML ER suspension; Take 5 mL by mouth Every 12 (Twelve) Hours As Needed for Cough.  Dispense: 50 mL; Refill: 0  -     POCT SARS-CoV-2 + Flu Antigen YOUSIF      BMI  is within normal parameters. No other follow-up for BMI required.        Follow Up  No follow-ups on file.    Lauren Anderson, APRN

## 2024-01-08 DIAGNOSIS — J06.9 UPPER RESPIRATORY TRACT INFECTION, UNSPECIFIED TYPE: ICD-10-CM

## 2024-01-08 RX ORDER — HYDROCODONE POLISTIREX AND CHLORPHENIRAMINE POLISTIREX 10; 8 MG/5ML; MG/5ML
5 SUSPENSION, EXTENDED RELEASE ORAL EVERY 12 HOURS PRN
Qty: 50 ML | Refills: 0 | OUTPATIENT
Start: 2024-01-08

## 2024-01-08 NOTE — TELEPHONE ENCOUNTER
Caller: Ingrid Joycelynethan Cuellar    Relationship: Self    Best call back number: [phone]     Requested Prescriptions:   Requested Prescriptions     Pending Prescriptions Disp Refills    Hydrocod Philippe-Chlorphe Philippe ER (TUSSIONEX PENNKINETIC) 10-8 MG/5ML ER suspension 50 mL 0     Sig: Take 5 mL by mouth Every 12 (Twelve) Hours As Needed for Cough.        Pharmacy where request should be sent: [facility] #[id number] - [place], [place] - [address] [place] [address] - [phone] [facility] [phone]      Last office visit with prescribing clinician: 2/22/2023   Last telemedicine visit with prescribing clinician: Visit date not found   Next office visit with prescribing clinician: Visit date not found     Additional details provided by patient: PATIENT IS OUT OF THIS MEDICATION, SHE STATED THAT IT WAS ONLY A 6 TBSP AMOUNT IN THAT BOTTLE. SHE HAS A LINGERING COUGH.    Does the patient have less than a 3 day supply:  [x] Yes  [] No    Would you like a call back once the refill request has been completed: [x] Yes [] No    If the office needs to give you a call back, can they leave a voicemail: [x] Yes [] No    Gurwinder Brown Rep   01/08/24 12:27 EST

## 2024-01-17 ENCOUNTER — OFFICE VISIT (OUTPATIENT)
Dept: FAMILY MEDICINE CLINIC | Facility: CLINIC | Age: 54
End: 2024-01-17
Payer: COMMERCIAL

## 2024-01-17 VITALS
BODY MASS INDEX: 24.39 KG/M2 | OXYGEN SATURATION: 98 % | HEART RATE: 77 BPM | WEIGHT: 129.2 LBS | SYSTOLIC BLOOD PRESSURE: 124 MMHG | TEMPERATURE: 98.2 F | DIASTOLIC BLOOD PRESSURE: 84 MMHG | HEIGHT: 61 IN

## 2024-01-17 DIAGNOSIS — M26.609 TMJ DYSFUNCTION: Primary | ICD-10-CM

## 2024-01-17 DIAGNOSIS — H65.03 NON-RECURRENT ACUTE SEROUS OTITIS MEDIA OF BOTH EARS: ICD-10-CM

## 2024-01-17 PROCEDURE — 99214 OFFICE O/P EST MOD 30 MIN: CPT | Performed by: INTERNAL MEDICINE

## 2024-01-17 RX ORDER — CYCLOBENZAPRINE HCL 5 MG
5 TABLET ORAL NIGHTLY
Qty: 10 TABLET | Refills: 0 | Status: SHIPPED | OUTPATIENT
Start: 2024-01-17

## 2024-01-17 RX ORDER — PREDNISONE 20 MG/1
20 TABLET ORAL DAILY
Qty: 5 TABLET | Refills: 0 | Status: SHIPPED | OUTPATIENT
Start: 2024-01-17 | End: 2024-01-22

## 2024-01-17 NOTE — ASSESSMENT & PLAN NOTE
Patient has localized pain in the right TMJ exacerbated by palpation as well as range of motion of her jaw.  There is no evidence clinically of subluxation or dislocation.  She admittedly is a regular chewer of gum, even doing so in the office, and advised her of the need to discontinue this habit, along with prescribing brief course of prednisone as noted, and cyclobenzaprine 5 mg nightly as needed for the next 10 days.  Advise if not improving.

## 2024-01-17 NOTE — PROGRESS NOTES
Answers submitted by the patient for this visit:  Primary Reason for Visit (Submitted on 2024)  What is the primary reason for your visit?: Ear Pain  Ear Pain Questionnaire (Submitted on 2024)  Chief Complaint: Ear pain  Affected ear: right  Chronicity: new  Onset: 1 to 4 weeks ago  Progression since onset: coming and going  Frequency: daily  Fever: no fever  Pain - numeric: 5/10  drainage: Yes  headaches: No  hoarse voice: No  jaw pain: Yes  Treatments tried : NSAIDs, antibiotics, heat packs  Improvement on treatment: no relief      Follow Up Office Visit      Date: 2024   Patient Name: Joycelyn Quintero  : 1970   MRN: [id number]     Chief Complaint:    Chief Complaint   Patient presents with    Earache       History of Present Illness: Joycelyn Quintero is a 53 y.o. female who is here today for primary complaint of some right ear discomfort associated with a popping sensation in her right TMJ region, also some mild sinus congestion which has significantly improved, occasionally having some bloody mucus upon blowing her nose.  No longer having cough.  No fever.  She saw Lauren Anderson, office APRN 2 weeks ago on 2024 having an acute respiratory infection, having tested negative for COVID-19 and influenza, prescribed empirically cefdinir 300 mg twice daily x 10 days which she has now completed in the last several days, along with given Flonase, Medrol Dosepak, and per patient request, Tussionex.  As an aside patient indicates that she was only provided by the pharmacy 30 mL despite having been prescribed 50 mL (nurse confirmation from pharmacist that in fact 50 mL was provided which patient disputes)..    Subjective      Review of Systems:   Review of Systems    I have reviewed the patients family history, social history, past medical history, past surgical history and have updated it as appropriate.     Medications:     Current Outpatient Medications:     fluconazole (DIFLUCAN) 150  "MG tablet, Take one tablet weekly, Disp: 4 tablet, Rfl: 11    fluticasone (FLONASE) 50 MCG/ACT nasal spray, 2 sprays into the nostril(s) as directed by provider Daily., Disp: 11.1 mL, Rfl: 0    levothyroxine (SYNTHROID, LEVOTHROID) 50 MCG tablet, TAKE 1 TABLET BY MOUTH EVERY DAY, Disp: 90 tablet, Rfl: 1    Multiple Vitamins-Minerals (MULTIVITAMIN & MINERAL PO), Take  by mouth., Disp: , Rfl:     Omega-3 Fatty Acids (FISH OIL) 1000 MG capsule capsule, Take  by mouth Daily With Breakfast., Disp: , Rfl:     cyclobenzaprine (FLEXERIL) 5 MG tablet, Take 1 tablet by mouth Every Night. As needed for TMJ dysfunction, Disp: 10 tablet, Rfl: 0    predniSONE (DELTASONE) 20 MG tablet, Take 1 tablet by mouth Daily for 5 days., Disp: 5 tablet, Rfl: 0    Allergies:   Allergies   Allergen Reactions    Biaxin [Clarithromycin]        Objective     Physical Exam: Please see above  Vital Signs:   Vitals:    01/17/24 1103   BP: 124/84   BP Location: Left arm   Patient Position: Sitting   Cuff Size: Adult   Pulse: 77   Temp: 98.2 °F (36.8 °C)   TempSrc: Temporal   SpO2: 98%   Weight: 58.6 kg (129 lb 3.2 oz)   Height: 154.9 cm (61\")     Body mass index is 24.41 kg/m².  BMI is within normal parameters. No other follow-up for BMI required.       Physical Exam  General: Pleasant 53-year-old female, healthy, BMI of 24.4.  Head and neck: TMs bilaterally with mild clear effusions, slightly more prominent left than right, no inflammatory changes with good light reflexes, canals bilaterally clear, nares mild congestion with some erythema on the right nasal septum but no active bleeding, no rhinorrhea, sinuses nontender, oropharynx is clear with good dentition and moist membranes, right TMJ region with some discomfort to palpation as well as to movement of her jaw, having some subjective popping sensation, neck with no adenopathy masses or tenderness  Lungs are clear with no wheeze tachypnea or cough  Cardiac regular rate rhythm with no murmurs " gallops or rubs  Neurological exam grossly normal    Procedures    Results:   Labs:   Hemoglobin A1C   Date Value Ref Range Status   02/22/2023 5.6 4.8 - 5.6 % Final     Comment:              Prediabetes: 5.7 - 6.4           Diabetes: >6.4           Glycemic control for adults with diabetes: <7.0     02/10/2021 4.95 4.80 - 5.60 % Final     TSH   Date Value Ref Range Status   02/22/2023 2.850 0.450 - 4.500 uIU/mL Final   02/10/2021 2.290 0.270 - 4.200 uIU/mL Final        POCT Results (if applicable):   Results for orders placed or performed in visit on 01/02/24   POCT SARS-CoV-2 + Flu Antigen YOUSIF    Specimen: Swab   Result Value Ref Range    SARS Antigen Not Detected Not Detected, Presumptive Negative    Influenza A Antigen YOUSIF Not Detected Not Detected    Influenza B Antigen YOUSIF Not Detected Not Detected    Internal Control Passed Passed    Lot Number [id number]     Expiration Date 12/04/2024        Imaging:   No valid procedures specified.     Assessment / Plan      Assessment/Plan:   Diagnoses and all orders for this visit:    1. TMJ dysfunction (Primary)  Assessment & Plan:  Patient has localized pain in the right TMJ exacerbated by palpation as well as range of motion of her jaw.  There is no evidence clinically of subluxation or dislocation.  She admittedly is a regular chewer of gum, even doing so in the office, and advised her of the need to discontinue this habit, along with prescribing brief course of prednisone as noted, and cyclobenzaprine 5 mg nightly as needed for the next 10 days.  Advise if not improving.    Orders:  -     predniSONE (DELTASONE) 20 MG tablet; Take 1 tablet by mouth Daily for 5 days.  Dispense: 5 tablet; Refill: 0  -     cyclobenzaprine (FLEXERIL) 5 MG tablet; Take 1 tablet by mouth Every Night. As needed for TMJ dysfunction  Dispense: 10 tablet; Refill: 0    2. Non-recurrent acute serous otitis media of both ears  Assessment & Plan:  Patient had a URI over 2 weeks ago that was treated  very appropriately with empiric cefdinir given lingering symptoms along with Flonase Medrol Dosepak and patient requested Tussionex.  She has had significant clinical improvement but does have evidence of bilateral serous effusions likely secondary to acute eustachian tube dysfunction.  For this reason as well as for her TMJ dysfunction, will add prednisone as prescribed x 5 days, spreading improvement over the next week or so.  Advise if any ongoing concerns.    Orders:  -     predniSONE (DELTASONE) 20 MG tablet; Take 1 tablet by mouth Daily for 5 days.  Dispense: 5 tablet; Refill: 0      Recommended patient obtain COVID-19, influenza and Shingrix vaccines once she has been off steroids for at least 10 to 14 days.    Follow Up:   Return in about 3 months (around 4/17/2024) for Well Child Visit.      At [facility], we believe that sharing information builds trust and better relationships. You are receiving this note because you recently visited [facility]. It is possible you will see health information before a provider has talked with you about it. This kind of information can be easy to misunderstand. To help you fully understand what it means for your health, we urge you to discuss this note with your provider.    Lorenzo Gibson MD  [facility]

## 2024-01-17 NOTE — ASSESSMENT & PLAN NOTE
Patient had a URI over 2 weeks ago that was treated very appropriately with empiric cefdinir given lingering symptoms along with Flonase Medrol Dosepak and patient requested Tussionex.  She has had significant clinical improvement but does have evidence of bilateral serous effusions likely secondary to acute eustachian tube dysfunction.  For this reason as well as for her TMJ dysfunction, will add prednisone as prescribed x 5 days, spreading improvement over the next week or so.  Advise if any ongoing concerns.

## 2024-03-03 DIAGNOSIS — J06.9 UPPER RESPIRATORY TRACT INFECTION, UNSPECIFIED TYPE: ICD-10-CM

## 2024-03-04 RX ORDER — FLUTICASONE PROPIONATE 50 MCG
2 SPRAY, SUSPENSION (ML) NASAL DAILY
Qty: 16 ML | Refills: 2 | Status: SHIPPED | OUTPATIENT
Start: 2024-03-04

## 2024-04-17 ENCOUNTER — OFFICE VISIT (OUTPATIENT)
Dept: FAMILY MEDICINE CLINIC | Facility: CLINIC | Age: 54
End: 2024-04-17
Payer: COMMERCIAL

## 2024-04-17 VITALS
HEART RATE: 74 BPM | TEMPERATURE: 98.2 F | SYSTOLIC BLOOD PRESSURE: 102 MMHG | BODY MASS INDEX: 24.24 KG/M2 | DIASTOLIC BLOOD PRESSURE: 77 MMHG | OXYGEN SATURATION: 98 % | WEIGHT: 128.4 LBS | HEIGHT: 61 IN

## 2024-04-17 DIAGNOSIS — K59.09 CHRONIC CONSTIPATION: ICD-10-CM

## 2024-04-17 DIAGNOSIS — Z00.01 ENCOUNTER FOR GENERAL ADULT MEDICAL EXAMINATION WITH ABNORMAL FINDINGS: Primary | ICD-10-CM

## 2024-04-17 DIAGNOSIS — F41.1 GENERALIZED ANXIETY DISORDER: ICD-10-CM

## 2024-04-17 DIAGNOSIS — Z13.1 SCREENING FOR DIABETES MELLITUS: ICD-10-CM

## 2024-04-17 DIAGNOSIS — E55.9 VITAMIN D DEFICIENCY: ICD-10-CM

## 2024-04-17 DIAGNOSIS — E03.9 ACQUIRED HYPOTHYROIDISM: ICD-10-CM

## 2024-04-17 DIAGNOSIS — M26.609 TMJ DYSFUNCTION: ICD-10-CM

## 2024-04-17 DIAGNOSIS — E78.5 DYSLIPIDEMIA: ICD-10-CM

## 2024-04-17 RX ORDER — POLYETHYLENE GLYCOL 3350 17 G/17G
POWDER, FOR SOLUTION ORAL
Qty: 527 G | Refills: 11 | Status: SHIPPED | OUTPATIENT
Start: 2024-04-17

## 2024-04-17 RX ORDER — CYCLOBENZAPRINE HCL 5 MG
TABLET ORAL
Qty: 30 TABLET | Refills: 3 | Status: SHIPPED | OUTPATIENT
Start: 2024-04-17

## 2024-04-17 RX ORDER — LEVOTHYROXINE SODIUM 0.05 MG/1
50 TABLET ORAL DAILY
Qty: 90 TABLET | Refills: 3 | Status: SHIPPED | OUTPATIENT
Start: 2024-04-17

## 2024-04-17 NOTE — PROGRESS NOTES
Venipuncture Blood Specimen Collection  Venipuncture performed in right arm by Marylin Singleton MA with good hemostasis. Patient tolerated the procedure well without complications.   04/17/24   Marylin Singleton MA

## 2024-04-17 NOTE — ASSESSMENT & PLAN NOTE
Not currently taking related meds, update screening and then make further recommendation accordingly

## 2024-04-17 NOTE — ASSESSMENT & PLAN NOTE
Longstanding history of anxiety disorder, having been treated aggressively in the past with Friday of medications, most recently indicating she is doing much better off medication, simply utilizing self reflection to maintain her symptom control.

## 2024-04-17 NOTE — PROGRESS NOTES
Female Physical Note      Date: 2024   Patient Name: Joycelyn Quintero  : 1970   MRN: [id number]     Chief Complaint:    Chief Complaint   Patient presents with    Annual Exam       History of Present Illness: Joycelyn Quintero is a 53 y.o. female who is here today for their annual health maintenance and physical.  Patient is also routine followed by Dr. Karen Phillips of OB/GYN, next appointment for Pap smear and general related review on 2024.  Her primary complaint relates to 2 issues, 1 of which is chronic constipation occurring ever since she recalls as a child, current pattern having hard stools every 4 days, bloated sensation, no melena or hematochezia, reporting that she never had been prescribed any targeted treatment for her constipation.  She reports eating a healthy diet and is physically active.  She also has had lingering problems with right-sided TMJ dysfunction and pain, no obvious subluxation, having last seen me this problem in 2024 which point I prescribed her prednisone, and Flexeril 5 mg nightly, noting the Flexeril did help but caused her to be excessively sleepy in the morning time.  She admits that she accentuates this problem by constantly chewing on gum.  Has hypothyroidism on replacement due for update of her testing, also has a history of chronic anxiety previously having been prescribed multiple related meds, but of late indicates that she controls her symptoms quite well simply by self reflection and does not feel she needs any targeted treatment at this time.  She is otherwise very healthy.  Denies any head or neck complaints other than her TMJ, no cardiopulmonary, , significant musculoskeletal or neurological problems.  Not depressed, mild anxiety chronically currently managed without meds.  Health maintenance includes colonoscopy with Dr. Thompson current from 2023 revealing a single tubular adenoma with a 5-year follow-up recommended, mammogram last  in 4/2023 scheduled for update on 5/1/2024, Pap smear last on 4/26/2023 with repeat Pap smear and GYN checkup schedule 5/1/2024, no history of prior abnormal Pap smears, recommended update Shingrix vaccine and COVID-19 vaccine series which she declines, also due for update of standard laboratory testing.      Subjective      Review of Systems:   Review of Systems    Past Medical History, Social History, Family History and Care Team were all reviewed with patient and updated as appropriate.     Medications:     Current Outpatient Medications:     levothyroxine (SYNTHROID, LEVOTHROID) 50 MCG tablet, Take 1 tablet by mouth Daily., Disp: 90 tablet, Rfl: 3    Multiple Vitamins-Minerals (MULTIVITAMIN & MINERAL PO), Take  by mouth., Disp: , Rfl:     Omega-3 Fatty Acids (FISH OIL) 1000 MG capsule capsule, Take  by mouth Daily With Breakfast., Disp: , Rfl:     cyclobenzaprine (FLEXERIL) 5 MG tablet, 0.5 to 1 tablet nightly as needed for TMJ dysfunction, Disp: 30 tablet, Rfl: 3    polyethylene glycol (MiraLax) 17 GM/SCOOP powder, 1/2-1 capful daily mixed with glass of juice or water, titrate to maintain 1 or 2 soft bowel movements daily, Disp: 527 g, Rfl: 11    Allergies:   Allergies   Allergen Reactions    Biaxin [Clarithromycin]        Immunizations:  Health Maintenance Summary            Overdue - ZOSTER VACCINE (1 of 2) Never done      01/17/2024  Postponed until 1/17/2024 by Trupti Thomas (Patient Refused - patient refused)              Postponed - COVID-19 Vaccine (1 - 2023-24 season) Postponed until 12/31/2024 04/17/2024  Postponed until 12/31/2024 by Trupti Thomas (Patient Refused - refused)    01/17/2024  Postponed until 1/19/2024 by Trupti Thomas (Patient Refused - refused)              INFLUENZA VACCINE (Yearly - August to March) Next due on 8/1/2024 01/17/2024  Postponed until 3/31/2024 by Trupti Thomas (Patient Refused - refused)    03/31/2023  Postponed until 3/31/2023 by Fernandez  Julissa (Patient Refused)              ANNUAL PHYSICAL (Yearly) Next due on 4/17/2025 04/17/2024  Done    02/22/2023  Registry Metric: Last Annual Physical              Scheduled - MAMMOGRAM (Every 2 Years) Scheduled for 5/1/2024 04/26/2023  Mammo Screening Digital Tomosynthesis Bilateral With CAD    04/20/2022  Mammo Screening Digital Tomosynthesis Bilateral With CAD    03/04/2021  Mammo Diagnostic Digital Tomosynthesis Left With CAD    02/10/2021  Mammo Screening Digital Tomosynthesis Bilateral With CAD    12/18/2019  Mammo Screening Digital Tomosynthesis Bilateral With CAD    Only the first 5 history entries have been loaded, but more history exists.              PAP SMEAR (Every 3 Years) Next due on 4/26/2026 04/26/2023  LIQUID-BASED PAP SMEAR WITH HPV GENOTYPING IF ASCUS ([facility])    04/25/2022  PAP SMEAR SCANNED    02/11/2021  Pap IG, HPV-hr              COLORECTAL CANCER SCREENING (COLONOSCOPY - Every 5 Years) Next due on 4/13/2028 01/17/2024  Follow-up changed to COLONOSCOPY - Every 5 Years and due date changed to 4/13/2028 by Lorenzo Gibson MD (Single 4 mm polyp, 5-year follow-up recommended with Dr. Thompson)    01/17/2024  Follow-up changed to COLONOSCOPY - Every 5 Years by Lorenzo Gibson MD (4 mm polyp, 5-year follow-up recommended with Dr. Thompson)    04/13/2023  COLONOSCOPY (Done - Single tubular adenoma, small internal hemorrhoids, 5-year follow-up recommended, Dr. Thompson)    10/29/2009  COLONOSCOPY (Done - Dr. Thompson, details unknown)              TDAP/TD VACCINES (2 - Td or Tdap) Next due on 2/22/2033 02/22/2023  Imm Admin: Tdap              HEPATITIS C SCREENING  Completed      02/22/2023  Hep C Virus Ab component of Hepatitis C Antibody              Pneumococcal Vaccine 0-64 (Series Information) Aged Out      No completion, postpone, or frequency change history exists for this topic.                     No orders of the defined types were placed in this encounter.    "    Colorectal Screenin2023 by Dr. Thompson revealing a tubular adenoma, 5-year follow-up recommended  Last Completed Colonoscopy            COLORECTAL CANCER SCREENING (COLONOSCOPY - Every 5 Years) Next due on 2023  COLONOSCOPY (Done - Single tubular adenoma, small internal hemorrhoids, 5-year follow-up recommended, Dr. Thompson)    10/29/2009  COLONOSCOPY (Done - Dr. Thompson, details unknown)                  Pap: Normal last on 2023, to be updated next month by gynecology  Last Completed Pap Smear            PAP SMEAR (Every 3 Years) Next due on 2023  LIQUID-BASED PAP SMEAR WITH HPV GENOTYPING IF ASCUS ([facility])    2022  PAP SMEAR SCANNED    2021  Pap IG, HPV-hr                   Mammogram: To be updated 2024  Last Completed Mammogram            Scheduled - MAMMOGRAM (Every 2 Years) Scheduled for 2023  Mammo Screening Digital Tomosynthesis Bilateral With CAD    2022  Mammo Screening Digital Tomosynthesis Bilateral With CAD    2021  Mammo Diagnostic Digital Tomosynthesis Left With CAD    02/10/2021  Mammo Screening Digital Tomosynthesis Bilateral With CAD    2019  Mammo Screening Digital Tomosynthesis Bilateral With CAD    Only the first 5 history entries have been loaded, but more history exists.                     CT for Smoker (Age 50-80, 20 pk yr):   N/A  Bone Density/DEXA (Age 65 or high risk): N/A  Hep C (Age 18-79 once): Negative in 2023  HIV (Age 15-65 once): No results found for: \"HIV1X2\" negative in 2023  A1c:   Hemoglobin A1C   Date Value Ref Range Status   2023 5.6 4.8 - 5.6 % Final     Comment:              Prediabetes: 5.7 - 6.4           Diabetes: >6.4           Glycemic control for adults with diabetes: <7.0     02/10/2021 4.95 4.80 - 5.60 % Final      Lipid panel:  No results found for: \"LIPIDEXCLUSI\"    The 10-year ASCVD risk score (Lowell CORONA, et al., 2019) is: 0.7%    " "Values used to calculate the score:      Age: 53 years      Sex: Female      Is Non- : No      Diabetic: No      Tobacco smoker: No      Systolic Blood Pressure: 102 mmHg      Is BP treated: No      HDL Cholesterol: 65 mg/dL      Total Cholesterol: 170 mg/dL    Dermatology: As needed  Ophthalmologist: Recommended screening  Dentist: Continue routine follow-up     Tobacco Use: Low Risk  (4/17/2024)    Patient History     Smoking Tobacco Use: Never     Smokeless Tobacco Use: Never     Passive Exposure: Not on file       Social History     Substance and Sexual Activity   Alcohol Use Yes    Comment: Socially        Social History     Substance and Sexual Activity   Drug Use Never        Diet/Physical activity: Healthy diet, physically active    Sexual Health: IUD contraception, not attempting pregnancy   Menopause: Amenorrheic secondary to IUD  Menstrual Cycles: Amenorrheic secondary to IUD, last menstrual cycle: Unknown given IUD    Depression: PHQ-2 Depression Screening  PHQ-9 Total Score: 0       Objective     Physical Exam:  Vital Signs:   Vitals:    04/17/24 0847   BP: 102/77   BP Location: Left arm   Patient Position: Sitting   Cuff Size: Adult   Pulse: 74   Temp: 98.2 °F (36.8 °C)   TempSrc: Temporal   SpO2: 98%   Weight: 58.2 kg (128 lb 6.4 oz)   Height: 154.9 cm (61\")     Facility age limit for growth %maday is 20 years.  Body mass index is 24.26 kg/m².     Physical Exam  Vitals and nursing note reviewed.   Constitutional:       General: She is not in acute distress.     Appearance: Normal appearance. She is normal weight. She is not ill-appearing.   HENT:      Head: Normocephalic and atraumatic.      Comments: Did focal right TMJ tenderness to palpation, no obvious subluxation, patient noted be vigorously chewing gum during the entire office encounter.     Right Ear: Tympanic membrane, ear canal and external ear normal.      Left Ear: Tympanic membrane, ear canal and external ear normal. "      Nose: Nose normal.      Mouth/Throat:      Mouth: Mucous membranes are moist.      Pharynx: Oropharynx is clear.   Eyes:      Extraocular Movements: Extraocular movements intact.      Conjunctiva/sclera: Conjunctivae normal.      Pupils: Pupils are equal, round, and reactive to light.   Neck:      Vascular: No carotid bruit.      Comments: Cervical adenopathy masses or tenderness, no periclavicular or axillary or inguinal adenopathy  Cardiovascular:      Rate and Rhythm: Normal rate and regular rhythm.      Pulses: Normal pulses.      Heart sounds: Normal heart sounds. No murmur heard.     No friction rub. No gallop.   Pulmonary:      Effort: Pulmonary effort is normal. No respiratory distress.      Breath sounds: Normal breath sounds.      Comments: No cough wheeze or dyspnea  Chest:   Breasts:     Right: Normal.      Comments: Exam deferred as has routine GYN follow-up  Abdominal:      General: Abdomen is flat. Bowel sounds are normal. There is no distension.      Palpations: Abdomen is soft. There is no mass.      Tenderness: There is no abdominal tenderness. There is no guarding or rebound.      Hernia: No hernia is present.      Comments: Nontender nondistended with no organomegaly or masses   Genitourinary:     Comments: Breast and pelvic exams deferred as routinely performed by GYN with upcoming appointment and no acute related concerns  Musculoskeletal:         General: No swelling, tenderness or deformity. Normal range of motion.      Cervical back: Normal range of motion and neck supple. No rigidity or tenderness.      Right lower leg: No edema.      Left lower leg: No edema.   Lymphadenopathy:      Cervical: No cervical adenopathy.      Upper Body:      Right upper body: No supraclavicular or axillary adenopathy.      Left upper body: No supraclavicular or axillary adenopathy.   Skin:     General: Skin is warm and dry.      Capillary Refill: Capillary refill takes less than 2 seconds.      Findings:  No lesion or rash.   Neurological:      General: No focal deficit present.      Mental Status: She is alert and oriented to person, place, and time. Mental status is at baseline.      Cranial Nerves: No cranial nerve deficit.      Sensory: No sensory deficit.      Motor: No weakness.      Coordination: Coordination normal.   Psychiatric:         Mood and Affect: Mood normal.         Behavior: Behavior normal.         Thought Content: Thought content normal.         Judgment: Judgment normal.         POCT Results (if applicable);   Results for orders placed or performed in visit on 01/02/24   POCT SARS-CoV-2 + Flu Antigen YOUSIF    Specimen: Swab   Result Value Ref Range    SARS Antigen Not Detected Not Detected, Presumptive Negative    Influenza A Antigen YOUSIF Not Detected Not Detected    Influenza B Antigen YOUSIF Not Detected Not Detected    Internal Control Passed Passed    Lot Number [id number]     Expiration Date 12/04/2024         Procedures    Assessment / Plan      Assessment/Plan:   Diagnoses and all orders for this visit:    1. Encounter for general adult medical examination with abnormal findings (Primary)  Assessment & Plan:  53-year-old female presenting for complete physical, health issues being addressed as detailed below but overall good general health, health maintenance includes colonoscopy current from 4/2023 with a 5-year follow-up recommended given tubular adenoma, mammogram and Pap smear/GYN follow-up to be updated next month, does not require DEXA scan at this stage, recommended to update Shingrix and COVID-19 vaccines which she declines, update pertinent screening labs.    Orders:  -     TSH; Future  -     T4, Free; Future  -     Comprehensive Metabolic Panel; Future  -     Lipid Panel; Future  -     Hemoglobin A1c; Future  -     Vitamin D,25-Hydroxy; Future  -     Vitamin D,25-Hydroxy  -     Hemoglobin A1c  -     Lipid Panel  -     Comprehensive Metabolic Panel  -     T4, Free  -     TSH    2.  Dyslipidemia  Assessment & Plan:  Not currently taking related meds, update screening and then make further recommendation accordingly    Orders:  -     Lipid Panel; Future  -     Lipid Panel    3. Acquired hypothyroidism  Assessment & Plan:  Currently taking levothyroxine 50 mcg daily.  Update thyroid function testing.    Orders:  -     levothyroxine (SYNTHROID, LEVOTHROID) 50 MCG tablet; Take 1 tablet by mouth Daily.  Dispense: 90 tablet; Refill: 3  -     TSH; Future  -     T4, Free; Future  -     T4, Free  -     TSH    4. Vitamin D deficiency  -     Vitamin D,25-Hydroxy; Future  -     Vitamin D,25-Hydroxy    5. Generalized anxiety disorder  Assessment & Plan:  Longstanding history of anxiety disorder, having been treated aggressively in the past with Friday of medications, most recently indicating she is doing much better off medication, simply utilizing self reflection to maintain her symptom control.      6. TMJ dysfunction  Assessment & Plan:  Patient with well-documented right TMJ dysfunction/pain exacerbated by palpation and range of motion testing of her jaw with no evidence of dislocation or subluxation, her symptoms undoubtedly exacerbated by the fact that she is an avid gum chewer, even noted to be doing so here in the office today.  I did prescribe her cyclobenzaprine 5 mg nightly to take 3 months ago, noting it did help her symptoms but caused excessive sedation.  Will reduce the cyclobenzaprine 5 mg down to 0.5 tablets of 2.5 mg nightly on a trial basis.  Strongly advised that she needs to discontinue her chewing gum habit to help improve her symptoms.    Orders:  -     cyclobenzaprine (FLEXERIL) 5 MG tablet; 0.5 to 1 tablet nightly as needed for TMJ dysfunction  Dispense: 30 tablet; Refill: 3    7. Chronic constipation  Assessment & Plan:  Patient reports longstanding difficulty with constipation ever since childhood, indicating she has never been formally treated for this disorder, most recent  colonoscopy in 4/2023 with Dr. Thompson revealing only a tubular adenoma.  She has significant bloating discomfort.  Plan initiate trial of MiraLAX initially 1 capful daily, titrating ideally to maintain 1 or 2 soft bowel movements daily, with me explicitly stated to patient that she must continue this medication on a routine basis rather than as needed given her chronic symptoms.  She is to pursue a healthy diet with plenty fruits and vegetables fiber and drinking plenty water with regular daily exercise.  If she is unable to improve her symptoms with MiraLAX, we will then discuss other options including Linzess or Amitiza.    Orders:  -     polyethylene glycol (MiraLax) 17 GM/SCOOP powder; 1/2-1 capful daily mixed with glass of juice or water, titrate to maintain 1 or 2 soft bowel movements daily  Dispense: 527 g; Refill: 11    8. Screening for diabetes mellitus  -     Comprehensive Metabolic Panel; Future  -     Hemoglobin A1c; Future  -     Hemoglobin A1c  -     Comprehensive Metabolic Panel         Healthcare Maintenance:  Counseling provided based on age appropriate USPSTF guidelines.  BMI is within normal parameters. No other follow-up for BMI required.    Joycelyn Quintero voices understanding and acceptance of this advice and will call back with any further questions or concerns. AVS with preventive healthcare tips printed for patient.       Follow Up:   Return in about 1 year (around 4/17/2025) for Annual physical.    At [facility], we believe that sharing information builds trust and better relationships. You are receiving this note because you recently visited [facility]. It is possible you will see health information before a provider has talked with you about it. This kind of information can be easy to misunderstand. To help you fully understand what it means for your health, we urge you to discuss this note with your provider.    Lorenzo Gibson MD  [facility]

## 2024-04-17 NOTE — ASSESSMENT & PLAN NOTE
Patient reports longstanding difficulty with constipation ever since childhood, indicating she has never been formally treated for this disorder, most recent colonoscopy in 4/2023 with Dr. Thompson revealing only a tubular adenoma.  She has significant bloating discomfort.  Plan initiate trial of MiraLAX initially 1 capful daily, titrating ideally to maintain 1 or 2 soft bowel movements daily, with me explicitly stated to patient that she must continue this medication on a routine basis rather than as needed given her chronic symptoms.  She is to pursue a healthy diet with plenty fruits and vegetables fiber and drinking plenty water with regular daily exercise.  If she is unable to improve her symptoms with MiraLAX, we will then discuss other options including Linzess or Amitiza.

## 2024-04-17 NOTE — ASSESSMENT & PLAN NOTE
Patient with well-documented right TMJ dysfunction/pain exacerbated by palpation and range of motion testing of her jaw with no evidence of dislocation or subluxation, her symptoms undoubtedly exacerbated by the fact that she is an avid gum chewer, even noted to be doing so here in the office today.  I did prescribe her cyclobenzaprine 5 mg nightly to take 3 months ago, noting it did help her symptoms but caused excessive sedation.  Will reduce the cyclobenzaprine 5 mg down to 0.5 tablets of 2.5 mg nightly on a trial basis.  Strongly advised that she needs to discontinue her chewing gum habit to help improve her symptoms.

## 2024-04-18 LAB
25(OH)D3+25(OH)D2 SERPL-MCNC: 66.2 NG/ML (ref 30–100)
ALBUMIN SERPL-MCNC: 4.6 G/DL (ref 3.8–4.9)
ALBUMIN/GLOB SERPL: 2.1 {RATIO} (ref 1.2–2.2)
ALP SERPL-CCNC: 116 IU/L (ref 44–121)
ALT SERPL-CCNC: 21 IU/L (ref 0–32)
AST SERPL-CCNC: 20 IU/L (ref 0–40)
BILIRUB SERPL-MCNC: 0.4 MG/DL (ref 0–1.2)
BUN SERPL-MCNC: 17 MG/DL (ref 6–24)
BUN/CREAT SERPL: 22 (ref 9–23)
CALCIUM SERPL-MCNC: 9.6 MG/DL (ref 8.7–10.2)
CHLORIDE SERPL-SCNC: 106 MMOL/L (ref 96–106)
CHOLEST SERPL-MCNC: 144 MG/DL (ref 100–199)
CO2 SERPL-SCNC: 25 MMOL/L (ref 20–29)
CREAT SERPL-MCNC: 0.78 MG/DL (ref 0.57–1)
EGFRCR SERPLBLD CKD-EPI 2021: 91 ML/MIN/1.73
GLOBULIN SER CALC-MCNC: 2.2 G/DL (ref 1.5–4.5)
GLUCOSE SERPL-MCNC: 94 MG/DL (ref 70–99)
HBA1C MFR BLD: 5.5 % (ref 4.8–5.6)
HDLC SERPL-MCNC: 45 MG/DL
LDLC SERPL CALC-MCNC: 85 MG/DL (ref 0–99)
POTASSIUM SERPL-SCNC: 4.6 MMOL/L (ref 3.5–5.2)
PROT SERPL-MCNC: 6.8 G/DL (ref 6–8.5)
SODIUM SERPL-SCNC: 143 MMOL/L (ref 134–144)
T4 FREE SERPL-MCNC: 1.39 NG/DL (ref 0.82–1.77)
TRIGL SERPL-MCNC: 72 MG/DL (ref 0–149)
TSH SERPL DL<=0.005 MIU/L-ACNC: 1.83 UIU/ML (ref 0.45–4.5)
VLDLC SERPL CALC-MCNC: 14 MG/DL (ref 5–40)

## 2024-04-19 ENCOUNTER — TELEPHONE (OUTPATIENT)
Dept: FAMILY MEDICINE CLINIC | Facility: CLINIC | Age: 54
End: 2024-04-19
Payer: COMMERCIAL

## 2024-04-19 NOTE — TELEPHONE ENCOUNTER
----- Message from Lorenzo Gibson MD sent at 4/18/2024  5:54 PM EDT -----  Please advise patient that all of her recently obtained laboratory testing was very satisfactory including her thyroid function testing, for which she should continue current dose of levothyroxine, also vitamin D, diabetic screen, cholesterol profile, and chemistry profile were all normal.      I have left  a vm regarding her lab results. I have asked that she call if she has any questions. TF

## 2024-05-01 ENCOUNTER — OFFICE VISIT (OUTPATIENT)
Dept: OBSTETRICS AND GYNECOLOGY | Facility: CLINIC | Age: 54
End: 2024-05-01
Payer: COMMERCIAL

## 2024-05-01 ENCOUNTER — HOSPITAL ENCOUNTER (OUTPATIENT)
Dept: MAMMOGRAPHY | Facility: HOSPITAL | Age: 54
Discharge: HOME | End: 2024-05-01
Admitting: OBSTETRICS & GYNECOLOGY
Payer: COMMERCIAL

## 2024-05-01 VITALS
DIASTOLIC BLOOD PRESSURE: 66 MMHG | HEIGHT: 61 IN | SYSTOLIC BLOOD PRESSURE: 102 MMHG | WEIGHT: 130.4 LBS | BODY MASS INDEX: 24.62 KG/M2

## 2024-05-01 DIAGNOSIS — Z01.419 WOMEN'S ANNUAL ROUTINE GYNECOLOGICAL EXAMINATION: Primary | ICD-10-CM

## 2024-05-01 DIAGNOSIS — R23.2 HOT FLASHES: ICD-10-CM

## 2024-05-01 DIAGNOSIS — Z30.431 IUD CHECK UP: ICD-10-CM

## 2024-05-01 DIAGNOSIS — N95.1 MENOPAUSAL SYMPTOMS: ICD-10-CM

## 2024-05-01 DIAGNOSIS — Z12.31 VISIT FOR SCREENING MAMMOGRAM: ICD-10-CM

## 2024-05-01 PROCEDURE — 99396 PREV VISIT EST AGE 40-64: CPT | Performed by: OBSTETRICS & GYNECOLOGY

## 2024-05-01 PROCEDURE — 77067 SCR MAMMO BI INCL CAD: CPT | Performed by: RADIOLOGY

## 2024-05-01 PROCEDURE — 77063 BREAST TOMOSYNTHESIS BI: CPT | Performed by: RADIOLOGY

## 2024-05-01 PROCEDURE — 77063 BREAST TOMOSYNTHESIS BI: CPT

## 2024-05-01 PROCEDURE — 77067 SCR MAMMO BI INCL CAD: CPT

## 2024-05-01 RX ORDER — FLUCONAZOLE 150 MG/1
TABLET ORAL
Qty: 4 TABLET | Refills: 11 | Status: SHIPPED | OUTPATIENT
Start: 2024-05-01

## 2024-05-01 NOTE — PROGRESS NOTES
"     Gynecologic Annual Exam Note          GYN Annual Exam     Annual        Subjective     HPI  Joycelyn Quintero is a 53 y.o. female, , who presents for annual well woman exam as a established patient. There were no changes to her medical or surgical history since her last visit.. Patients menses are absent, secondary to Mirena.   She denies dysmenorrhea. Marital Status: single. She is sexually active. She has not had new partners.. STD testing recommendations have been explained to the patient and she declines STD testing.    The patient would like to discuss the following complaints today: Occasional, lower abdominal pain that she decribes as similar to menstrual cramps and \"twinges\", but is not cyclic.  Occurs at random.     Additional OB/GYN History   contraceptive methods: IUD.  Insertion date:   Desires to: continue contraception  History of migraines: no    Last Pap : 2023. Result: negative. HPV: not done. Her last HPV testing was 2021 and was negative..   Last Completed Pap Smear            Ordered - PAP SMEAR (Every 3 Years) Ordered on 2023  LIQUID-BASED PAP SMEAR WITH HPV GENOTYPING IF ASCUS ([facility])    2022  PAP SMEAR SCANNED    2021  Pap IG, HPV-hr                  History of abnormal Pap smear: no  Family history of uterine, colon, breast, or ovarian cancer: no  Performs monthly Self-Breast Exam: no  Last mammogram: 2024. Done at [facility]. There is a copy in the chart.    Last Completed Mammogram            Scheduled - MAMMOGRAM (Every 2 Years) Scheduled for 2023  Mammo Screening Digital Tomosynthesis Bilateral With CAD    2022  Mammo Screening Digital Tomosynthesis Bilateral With CAD    2021  Mammo Diagnostic Digital Tomosynthesis Left With CAD    02/10/2021  Mammo Screening Digital Tomosynthesis Bilateral With CAD    2019  Mammo Screening Digital Tomosynthesis Bilateral With CAD    " Only the first 5 history entries have been loaded, but more history exists.                    Colonoscopy: has had a colonoscopy Fall - normal.  Had completed with Dr. Thompson  Exercises Regularly: yes  Feelings of Anxiety or Depression: no  Tobacco Usage?: No       Current Outpatient Medications:     cyclobenzaprine (FLEXERIL) 5 MG tablet, 0.5 to 1 tablet nightly as needed for TMJ dysfunction, Disp: 30 tablet, Rfl: 3    levothyroxine (SYNTHROID, LEVOTHROID) 50 MCG tablet, Take 1 tablet by mouth Daily., Disp: 90 tablet, Rfl: 3    Multiple Vitamins-Minerals (MULTIVITAMIN & MINERAL PO), Take  by mouth., Disp: , Rfl:     Omega-3 Fatty Acids (FISH OIL) 1000 MG capsule capsule, Take  by mouth Daily With Breakfast., Disp: , Rfl:     polyethylene glycol (MiraLax) 17 GM/SCOOP powder, 1/2-1 capful daily mixed with glass of juice or water, titrate to maintain 1 or 2 soft bowel movements daily, Disp: 527 g, Rfl: 11    fluconazole (Diflucan) 150 MG tablet, Take one tablet once weekly, Disp: 4 tablet, Rfl: 11     Patient is requesting refills of diflucan.    OB History          2    Para   2    Term   2            AB        Living   2         SAB        IAB        Ectopic        Molar        Multiple        Live Births   2                Past Medical History:   Diagnosis Date    Disease of thyroid gland     DVT (deep venous thrombosis)     Left Leg    Encounter for general adult medical examination with abnormal findings 2024    Hypothyroid     Mitral valve prolapse     Pemphigus vulgaris     Spinal headache [id number]    I had an epidural headache.  It required 3 blood patches.        Past Surgical History:   Procedure Laterality Date    COLONOSCOPY  10/29/2009    Dr. Thompson    CYSTOSCOPY      DILATATION AND CURETTAGE      ENDOSCOPY  2007    Dr. Thompson, duodenal diverticulum, gastric atrophy, Schatzki's ring    ERCP         Health Maintenance   Topic Date Due    ZOSTER VACCINE (1 of 2) Never  "done    Annual Gynecologic Pelvic and Breast Exam  04/27/2024    COVID-19 Vaccine (1 - 2023-24 season) 12/31/2024 (Originally 9/1/2023)    INFLUENZA VACCINE  08/01/2024    ANNUAL PHYSICAL  04/17/2025    MAMMOGRAM  04/26/2025    PAP SMEAR  04/26/2026    COLORECTAL CANCER SCREENING  04/13/2028    TDAP/TD VACCINES (2 - Td or Tdap) 02/22/2033    HEPATITIS C SCREENING  Completed    Pneumococcal Vaccine 0-64  Aged Out       The additional following portions of the patient's history were reviewed and updated as appropriate: allergies, current medications, past family history, past medical history, past social history, past surgical history, and problem list.    Review of Systems   Constitutional: Negative.    HENT: Negative.     Eyes: Negative.    Respiratory: Negative.     Cardiovascular: Negative.    Gastrointestinal: Negative.    Endocrine: Negative.    Genitourinary:  Positive for pelvic pain (occasional \"twinges\" of pain).   Musculoskeletal: Negative.    Skin: Negative.    Allergic/Immunologic: Negative.    Neurological: Negative.    Hematological: Negative.    Psychiatric/Behavioral: Negative.           I have reviewed and agree with the HPI, ROS, and historical information as entered above. Karen Phillips MD          Objective   /66   Ht 154.9 cm (61\")   Wt 59.1 kg (130 lb 6.4 oz)   LMP  (LMP Unknown)   BMI 24.64 kg/m²     Physical Exam  Vitals and nursing note reviewed. Exam conducted with a chaperone present.   Constitutional:       Appearance: She is well-developed.   HENT:      Head: Normocephalic and atraumatic.   Eyes:      Pupils: Pupils are equal, round, and reactive to light.   Neck:      Thyroid: No thyroid mass or thyromegaly.   Pulmonary:      Effort: Pulmonary effort is normal. No retractions.   Chest:      Chest wall: No mass.   Breasts:     Right: Normal. No mass, nipple discharge, skin change or tenderness.      Left: Normal. No mass, nipple discharge, skin change or tenderness.   Abdominal: "      General: Bowel sounds are normal.      Palpations: Abdomen is soft. Abdomen is not rigid. There is no mass.      Tenderness: There is no abdominal tenderness. There is no guarding.      Hernia: No hernia is present. There is no hernia in the left inguinal area or right inguinal area.   Genitourinary:     Exam position: Lithotomy position.      Pubic Area: No rash.       Labia:         Right: No rash, tenderness or lesion.         Left: No rash, tenderness or lesion.       Urethra: No urethral pain or urethral swelling.      Vagina: Normal. No vaginal discharge or lesions.      Cervix: No cervical motion tenderness, discharge, lesion or cervical bleeding.      Uterus: Normal. Not enlarged, not fixed and not tender.       Adnexa:         Right: No mass, tenderness or fullness.          Left: No mass, tenderness or fullness.        Rectum: No external hemorrhoid.   Musculoskeletal:      Cervical back: Normal range of motion. No muscular tenderness.      Right lower leg: No edema.      Left lower leg: No edema.   Skin:     General: Skin is warm and dry.   Neurological:      Mental Status: She is alert and oriented to person, place, and time.      Motor: Motor function is intact.   Psychiatric:         Mood and Affect: Mood and affect normal.         Behavior: Behavior normal.     Strings seen       Assessment and Plan    Problem List Items Addressed This Visit    None  Visit Diagnoses       Women's annual routine gynecological examination    -  Primary    Relevant Medications    fluconazole (Diflucan) 150 MG tablet    Other Relevant Orders    LIQUID-BASED PAP SMEAR WITH HPV GENOTYPING REGARDLESS OF INTERPRETATION ([facility])    IUD check up        Hot flashes        Relevant Orders    Follicle Stimulating Hormone    Estradiol    Menopausal symptoms                GYN annual well woman exam.   Check menopausal labs today. If consistent, discussed option of HRT. She is interested in starting estradiol patch, her  mirena is >5 yrs and couldn't be used for endometrial protection at that point, but could remove and switch to PO prometrium. Will call with labs.   Pap guidelines reviewed.  Reviewed monthly self breast exams.  Instructed to call with lumps, pain, or breast discharge.    Reviewed exercise as a preventative health measures.   Return in about 1 year (around 5/1/2025) for Annual physical.     Karen Phillips MD  05/01/2024

## 2024-05-02 LAB
ESTRADIOL SERPL-MCNC: 7.7 PG/ML
FSH SERPL-ACNC: 108 MIU/ML

## 2024-05-03 RX ORDER — ESTRADIOL 0.05 MG/D
1 PATCH, EXTENDED RELEASE TRANSDERMAL 2 TIMES WEEKLY
Qty: 8 PATCH | Refills: 11 | Status: SHIPPED | OUTPATIENT
Start: 2024-05-06

## 2024-05-03 RX ORDER — PROGESTERONE 200 MG/1
200 CAPSULE ORAL NIGHTLY
Qty: 30 CAPSULE | Refills: 11 | Status: SHIPPED | OUTPATIENT
Start: 2024-05-03

## 2024-05-08 LAB — REF LAB TEST METHOD: NORMAL

## 2024-06-10 ENCOUNTER — TRANSCRIBE ORDERS (OUTPATIENT)
Dept: ADMINISTRATIVE | Facility: HOSPITAL | Age: 54
End: 2024-06-10
Payer: COMMERCIAL

## 2024-06-10 DIAGNOSIS — Z12.31 OTHER SCREENING MAMMOGRAM: Primary | ICD-10-CM

## 2025-04-16 ENCOUNTER — OFFICE VISIT (OUTPATIENT)
Dept: OBSTETRICS AND GYNECOLOGY | Facility: CLINIC | Age: 55
End: 2025-04-16
Payer: COMMERCIAL

## 2025-04-16 VITALS
WEIGHT: 126.8 LBS | SYSTOLIC BLOOD PRESSURE: 128 MMHG | DIASTOLIC BLOOD PRESSURE: 86 MMHG | HEIGHT: 61 IN | BODY MASS INDEX: 23.94 KG/M2

## 2025-04-16 DIAGNOSIS — Z30.431 IUD CHECK UP: ICD-10-CM

## 2025-04-16 DIAGNOSIS — R10.2 PELVIC PAIN: Primary | ICD-10-CM

## 2025-04-16 DIAGNOSIS — M54.50 ACUTE LOW BACK PAIN, UNSPECIFIED BACK PAIN LATERALITY, UNSPECIFIED WHETHER SCIATICA PRESENT: ICD-10-CM

## 2025-04-16 PROCEDURE — 99214 OFFICE O/P EST MOD 30 MIN: CPT | Performed by: OBSTETRICS & GYNECOLOGY

## 2025-04-16 RX ORDER — PHENAZOPYRIDINE HYDROCHLORIDE 200 MG/1
200 TABLET, FILM COATED ORAL 3 TIMES DAILY PRN
Qty: 20 TABLET | Refills: 2 | Status: SHIPPED | OUTPATIENT
Start: 2025-04-16

## 2025-04-16 NOTE — PROGRESS NOTES
"            Chief Complaint   Patient presents with    Back Pain    Urinary Frequency       Subjective   HPI  Joycelyn Quintero is a 54 y.o. female, . Her last LMP was No LMP recorded. Patient has had an implant.. who presents to discuss back pain, cramping and urinary frequency. Pt reports a constant lower back ache that radiates into her lower abdomen that started approx 1 month ago. Reports a menstrual cramping feeling or \"twinges\" in her pelvis with certain positions. Denies any vaginal bleeding. She as a Mirena in place that was inserted in 2018. Also complaining of urinary frequency, especially at night. Denies any dysuria or urgency. States she took some leftover Macrobid she had at home and felt the back pain improved for a few days but then returned.     CCUA collected today and negative.    US done today: Yes.  Findings showed iud in correct position. Normal uterus, normal ovaries. .  I have personally evaluated the U/S and agree with the findings. Karen Phillips MD         Additional OB/GYN History     Last Pap : 24 Neg, HPV Neg  Last Completed Pap Smear            Upcoming       PAP SMEAR (Every 3 Years) Next due on 2024  LIQUID-BASED PAP SMEAR WITH HPV GENOTYPING REGARDLESS OF INTERPRETATION ([facility])    2023  LIQUID-BASED PAP SMEAR WITH HPV GENOTYPING IF ASCUS ([facility])    2022  PAP SMEAR SCANNED    2021  Pap IG, HPV-hr                            Last mammogram:   Last Completed Mammogram            Awaiting Completion       MAMMOGRAM (Every 2 Years) Scheduled for 2025      06/10/2024  Order placed for Mammo Screening Digital Tomosynthesis Bilateral With CAD by Jessika Lee RegSched Rep    2024  Mammo Screening Digital Tomosynthesis Bilateral With CAD    2023  Mammo Screening Digital Tomosynthesis Bilateral With CAD    2022  Mammo Screening Digital Tomosynthesis Bilateral With CAD    2021  Mammo Diagnostic " Digital Tomosynthesis Left With CAD     Only the first 5 history entries have been loaded, but more history exists.                          Tobacco Usage?: No   OB History          2    Para   2    Term   2            AB        Living   2         SAB        IAB        Ectopic        Molar        Multiple        Live Births   2                  Current Outpatient Medications:     fluconazole (Diflucan) 150 MG tablet, Take one tablet once weekly, Disp: 4 tablet, Rfl: 11    levothyroxine (SYNTHROID, LEVOTHROID) 50 MCG tablet, Take 1 tablet by mouth Daily., Disp: 90 tablet, Rfl: 3    Multiple Vitamins-Minerals (MULTIVITAMIN & MINERAL PO), Take  by mouth., Disp: , Rfl:     Omega-3 Fatty Acids (FISH OIL) 1000 MG capsule capsule, Take  by mouth Daily With Breakfast., Disp: , Rfl:     phenazopyridine (Pyridium) 200 MG tablet, Take 1 tablet by mouth 3 (Three) Times a Day As Needed for Bladder Spasms., Disp: 20 tablet, Rfl: 2     Past Medical History:   Diagnosis Date    Disease of thyroid gland     DVT (deep venous thrombosis)     Left Leg    Encounter for general adult medical examination with abnormal findings 2024    Hypothyroid     Mitral valve prolapse     Pemphigus vulgaris     Spinal headache [id number]    I had an epidural headache.  It required 3 blood patches.        Past Surgical History:   Procedure Laterality Date    COLONOSCOPY  10/29/2009    Dr. Thompson    CYSTOSCOPY      DILATATION AND CURETTAGE      ENDOSCOPY  2007    Dr. Thompson, duodenal diverticulum, gastric atrophy, Schatzki's ring    ERCP         The additional following portions of the patient's history were reviewed and updated as appropriate: allergies, current medications, past family history, past medical history, past social history, past surgical history, and problem list.    Review of Systems   Constitutional: Negative.    HENT: Negative.     Eyes: Negative.    Respiratory: Negative.     Cardiovascular: Negative.   "  Gastrointestinal: Negative.    Endocrine: Negative.    Genitourinary:  Positive for frequency and pelvic pain.   Musculoskeletal:  Positive for back pain.   Skin: Negative.    Allergic/Immunologic: Negative.    Neurological: Negative.    Hematological: Negative.    Psychiatric/Behavioral: Negative.         I have reviewed and agree with the HPI, ROS, and historical information as entered above. Karen Phillips MD      Objective   /86   Ht 154.9 cm (61\")   Wt 57.5 kg (126 lb 12.8 oz)   BMI 23.96 kg/m²     Physical Exam  Vitals and nursing note reviewed.   Constitutional:       Appearance: Normal appearance.   HENT:      Head: Normocephalic and atraumatic.   Eyes:      General: No scleral icterus.     Pupils: Pupils are equal, round, and reactive to light.   Pulmonary:      Effort: Pulmonary effort is normal.      Breath sounds: Normal breath sounds.   Abdominal:      General: Bowel sounds are normal. There is no distension.      Palpations: Abdomen is soft.      Tenderness: There is no abdominal tenderness.   Musculoskeletal:         General: Normal range of motion.      Cervical back: Normal range of motion and neck supple.      Right lower leg: No edema.      Left lower leg: No edema.   Skin:     General: Skin is warm and dry.   Neurological:      General: No focal deficit present.      Mental Status: She is alert and oriented to person, place, and time.   Psychiatric:         Mood and Affect: Mood normal.         Behavior: Behavior normal. Behavior is cooperative.         Assessment & Plan     Assessment     Problem List Items Addressed This Visit       Lumbago    Relevant Orders    US Non-ob Transvaginal     Other Visit Diagnoses         Pelvic pain    -  Primary    Relevant Orders    US Non-ob Transvaginal      IUD check up                Iud in correct position on US and no other abnl seen.  CCUA neg. She also describes frequent urination at night. She thinks she was eval for IC in the past. Will try " some pyridium and rec eval with pelvic floor PT. She has FU next month for her annual. Discussed pulling her iud at that time (labs prev documented menopause), and can make referral at that time if she desires.     Plan     All questions answered.  I spent 30 minutes caring for Joycelyn on this date of service. This time includes time spent by me in the following activities:preparing for the visit, reviewing tests, obtaining and/or reviewing a separately obtained history, ordering medications, tests, or procedures, and documenting information in the medical record    No follow-ups on file.        Karen Phillips MD  04/16/2025

## 2025-05-07 ENCOUNTER — OFFICE VISIT (OUTPATIENT)
Dept: OBSTETRICS AND GYNECOLOGY | Facility: CLINIC | Age: 55
End: 2025-05-07
Payer: COMMERCIAL

## 2025-05-07 ENCOUNTER — HOSPITAL ENCOUNTER (OUTPATIENT)
Dept: MAMMOGRAPHY | Facility: HOSPITAL | Age: 55
Discharge: HOME | End: 2025-05-07
Admitting: OBSTETRICS & GYNECOLOGY
Payer: COMMERCIAL

## 2025-05-07 VITALS
WEIGHT: 126 LBS | HEIGHT: 61 IN | SYSTOLIC BLOOD PRESSURE: 118 MMHG | BODY MASS INDEX: 23.79 KG/M2 | DIASTOLIC BLOOD PRESSURE: 70 MMHG

## 2025-05-07 DIAGNOSIS — Z12.31 OTHER SCREENING MAMMOGRAM: ICD-10-CM

## 2025-05-07 DIAGNOSIS — Z01.419 WOMEN'S ANNUAL ROUTINE GYNECOLOGICAL EXAMINATION: Primary | ICD-10-CM

## 2025-05-07 DIAGNOSIS — Z30.432 ENCOUNTER FOR REMOVAL OF INTRAUTERINE CONTRACEPTIVE DEVICE (IUD): ICD-10-CM

## 2025-05-07 LAB
NCCN CRITERIA FLAG: NORMAL
TYRER CUZICK SCORE: 6.9

## 2025-05-07 PROCEDURE — 77067 SCR MAMMO BI INCL CAD: CPT

## 2025-05-07 PROCEDURE — 77063 BREAST TOMOSYNTHESIS BI: CPT

## 2025-05-07 NOTE — PROGRESS NOTES
Gynecologic Annual Exam Note        GYN Annual Exam     CC - Here for annual exam.        HPI  Joycelyn Quintero is a 54 y.o. female, , who presents for annual well woman exam as a established patient.  She is postmenopausal.. Denies vaginal bleeding.   There were no changes to her medical or surgical history since her last visit. Marital Status: .  She is sexually active. She has not had new partners.. STD testing recommendations have been explained to the patient and she declines STD testing.    Patient with mirena that was inserted in 2018. Would like to discuss removal before proceeding. She had labs previously that confirmed menopause.     The patient would like to discuss the following complaints today: none    Additional OB/GYN History   On HRT? No    Last Pap : 2024. Results: negative. HPV: negative.   Last Completed Pap Smear            Upcoming       PAP SMEAR (Every 3 Years) Next due on 2024  LIQUID-BASED PAP SMEAR WITH HPV GENOTYPING REGARDLESS OF INTERPRETATION ([facility])    2023  LIQUID-BASED PAP SMEAR WITH HPV GENOTYPING IF ASCUS ([facility])    2022  PAP SMEAR SCANNED    2021  Pap IG, HPV-hr                          History of abnormal Pap smear: no  Family history of uterine, colon, breast, or ovarian cancer: no  Performs monthly Self-Breast Exam: no  Last mammogram: 2025. Done at [facility]. There is a copy in the chart.    Last Completed Mammogram            Awaiting Completion       MAMMOGRAM (Every 2 Years) Scheduled for 2025  Order placed for Mammo Screening Digital Tomosynthesis Bilateral With CAD by Jessika Lee RegSched Rep    2024  Mammo Screening Digital Tomosynthesis Bilateral With CAD    2023  Mammo Screening Digital Tomosynthesis Bilateral With CAD    2022  Mammo Screening Digital Tomosynthesis Bilateral With CAD    2021  Mammo Diagnostic Digital Tomosynthesis  Left With CAD     Only the first 5 history entries have been loaded, but more history exists.                        Last colonoscopy: 2023    Last Completed Colonoscopy            Upcoming       COLORECTAL CANCER SCREENING (COLONOSCOPY - Every 5 Years) Next due on 2023  COLONOSCOPY (Done - Single tubular adenoma, small internal hemorrhoids, 5-year follow-up recommended, Dr. Thompson)    10/29/2009  COLONOSCOPY (Done - Dr. Thompson, details unknown)                            She has never had a bone density scan  Exercises Regularly: yes  Feelings of Anxiety or Depression: no      Tobacco Usage?: No       Current Outpatient Medications:     fluconazole (Diflucan) 150 MG tablet, Take one tablet once weekly, Disp: 4 tablet, Rfl: 11    levothyroxine (SYNTHROID, LEVOTHROID) 50 MCG tablet, Take 1 tablet by mouth Daily., Disp: 90 tablet, Rfl: 3    Multiple Vitamins-Minerals (MULTIVITAMIN & MINERAL PO), Take  by mouth., Disp: , Rfl:     Omega-3 Fatty Acids (FISH OIL) 1000 MG capsule capsule, Take  by mouth Daily With Breakfast., Disp: , Rfl:     phenazopyridine (Pyridium) 200 MG tablet, Take 1 tablet by mouth 3 (Three) Times a Day As Needed for Bladder Spasms., Disp: 20 tablet, Rfl: 2    Patient denies the need for medication refills today.    OB History          2    Para   2    Term   2            AB        Living   2         SAB        IAB        Ectopic        Molar        Multiple        Live Births   2                Past Medical History:   Diagnosis Date    Disease of thyroid gland     DVT (deep venous thrombosis)     Left Leg    Encounter for general adult medical examination with abnormal findings 2024    Hypothyroid     Mitral valve prolapse     Pemphigus vulgaris     Spinal headache [id number]    I had an epidural headache.  It required 3 blood patches.        Past Surgical History:   Procedure Laterality Date    COLONOSCOPY  10/29/2009    Dr. Thompson    CYSTOSCOPY   "    DILATATION AND CURETTAGE      ENDOSCOPY  09/14/2007    Dr. Thompson, duodenal diverticulum, gastric atrophy, Schatzki's ring    ERCP         Health Maintenance   Topic Date Due    Pneumococcal Vaccine 50+ (1 of 1 - PCV) Never done    ZOSTER VACCINE (1 of 2) Never done    COVID-19 Vaccine (1 - 2024-25 season) Never done    ANNUAL PHYSICAL  04/17/2025    Annual Gynecologic Pelvic and Breast Exam  05/02/2025    INFLUENZA VACCINE  07/01/2025    MAMMOGRAM  05/01/2026    PAP SMEAR  05/01/2027    COLORECTAL CANCER SCREENING  04/13/2028    TDAP/TD VACCINES (2 - Td or Tdap) 02/22/2033    HEPATITIS C SCREENING  Completed       The additional following portions of the patient's history were reviewed and updated as appropriate: allergies, current medications, past family history, past medical history, past social history, past surgical history, and problem list.    Review of Systems   Constitutional: Negative.    HENT: Negative.     Eyes: Negative.    Respiratory: Negative.     Cardiovascular: Negative.    Gastrointestinal: Negative.    Endocrine: Negative.    Genitourinary: Negative.    Musculoskeletal: Negative.    Skin: Negative.    Allergic/Immunologic: Negative.    Neurological: Negative.    Hematological: Negative.    Psychiatric/Behavioral: Negative.         I have reviewed and agree with the HPI, ROS, and historical information as entered above. Karen Phillips MD      Objective   /70   Ht 154.9 cm (61\")   Wt 57.2 kg (126 lb)   BMI 23.81 kg/m²     Physical Exam  Vitals and nursing note reviewed. Exam conducted with a chaperone present.   Constitutional:       Appearance: She is well-developed.   HENT:      Head: Normocephalic and atraumatic.   Eyes:      Pupils: Pupils are equal, round, and reactive to light.   Neck:      Thyroid: No thyroid mass or thyromegaly.   Pulmonary:      Effort: Pulmonary effort is normal. No retractions.   Chest:      Chest wall: No mass.   Breasts:     Right: Normal. No mass, nipple " discharge, skin change or tenderness.      Left: Normal. No mass, nipple discharge, skin change or tenderness.   Abdominal:      General: Bowel sounds are normal.      Palpations: Abdomen is soft. Abdomen is not rigid. There is no mass.      Tenderness: There is no abdominal tenderness. There is no guarding.      Hernia: No hernia is present. There is no hernia in the left inguinal area or right inguinal area.   Genitourinary:     Exam position: Lithotomy position.      Pubic Area: No rash.       Labia:         Right: No rash, tenderness or lesion.         Left: No rash, tenderness or lesion.       Urethra: No urethral pain or urethral swelling.      Vagina: Normal. No vaginal discharge or lesions.      Cervix: No cervical motion tenderness, discharge, lesion or cervical bleeding.      Uterus: Normal. Not enlarged, not fixed and not tender.       Adnexa:         Right: No mass, tenderness or fullness.          Left: No mass, tenderness or fullness.        Rectum: No external hemorrhoid.   Musculoskeletal:      Cervical back: Normal range of motion. No muscular tenderness.      Right lower leg: No edema.      Left lower leg: No edema.   Skin:     General: Skin is warm and dry.   Neurological:      Mental Status: She is alert and oriented to person, place, and time.      Motor: Motor function is intact.   Psychiatric:         Mood and Affect: Mood and affect normal.         Behavior: Behavior normal.            Assessment and Plan    Problem List Items Addressed This Visit    None  Visit Diagnoses         Women's annual routine gynecological examination    -  Primary      Encounter for removal of intrauterine contraceptive device (IUD)                GYN annual well woman exam.   Reviewed monthly self breast exams.  Instructed to call with lumps, pain, or breast discharge.  Yearly mammograms ordered.  Recommended use of Vitamin D and getting adequate calcium in her diet. (1500mg)  Reviewed exercise as a preventative  "health measures.   Return in about 1 year (around 5/7/2026) for Annual physical.         Karen Phillips MD  05/07/2025         Procedure: IUD Removal     Procedures    Pre procedure indication     1) Desires Removal of IUD    Post procedure indication   1) Desires Removal of IUD    /70   Ht 154.9 cm (61\")   Wt 57.2 kg (126 lb)   BMI 23.81 kg/m²       The patient presents today for removal of her IUD.  She requests removal of her IUD due to  being menopausal .  All her questions were answered and consents were signed.    Time out: immediate members of the procedure team and patient agree to the following: correct patient, correct site, correct procedure to be performed. Karen Phillips MD        The patient was placed in a dorsal lithotomy position on the examining table in stirps.  A speculum was inserted into the vagina and the cervix was brought into view.  An IUD string was visualized.  The string was then grasped and removed intact with gentle traction.  There was a Small amount of bleeding and cramping.    All  instruments were removed from the vagina.       The patient tolerated the procedure well with a mild amount of discomfort.  She was monitored for 10 minutes prior to discharge.      There were no complications.    The patient was counseled on other options for birth control. She plans to use no method for contraception.     Problem List Items Addressed This Visit    None  Visit Diagnoses         Women's annual routine gynecological examination    -  Primary      Encounter for removal of intrauterine contraceptive device (IUD)                  Karen Phillips MD  05/07/2025   "

## 2025-05-13 DIAGNOSIS — E03.9 ACQUIRED HYPOTHYROIDISM: ICD-10-CM

## 2025-05-13 RX ORDER — LEVOTHYROXINE SODIUM 50 MCG
50 TABLET ORAL DAILY
Qty: 90 TABLET | Refills: 0 | Status: SHIPPED | OUTPATIENT
Start: 2025-05-13

## 2025-05-13 NOTE — TELEPHONE ENCOUNTER
I have spoke with her and have let her know that she is due for a physical. She states that she has a form to bring for work when doing a physical and as soon as she gets that she will call the office and schedule this. I have given her one month refill so that she can do this. TF      HUB may read and schedule a physical.

## 2025-08-04 DIAGNOSIS — Z01.419 WOMEN'S ANNUAL ROUTINE GYNECOLOGICAL EXAMINATION: ICD-10-CM

## 2025-08-04 RX ORDER — FLUCONAZOLE 150 MG/1
TABLET ORAL
Qty: 4 TABLET | Refills: 11 | Status: SHIPPED | OUTPATIENT
Start: 2025-08-04